# Patient Record
Sex: FEMALE | Race: BLACK OR AFRICAN AMERICAN | NOT HISPANIC OR LATINO | Employment: FULL TIME | URBAN - METROPOLITAN AREA
[De-identification: names, ages, dates, MRNs, and addresses within clinical notes are randomized per-mention and may not be internally consistent; named-entity substitution may affect disease eponyms.]

---

## 2023-10-16 ENCOUNTER — LAB (OUTPATIENT)
Dept: LAB | Facility: LAB | Age: 68
End: 2023-10-16
Payer: COMMERCIAL

## 2023-10-16 ENCOUNTER — APPOINTMENT (OUTPATIENT)
Dept: LAB | Facility: LAB | Age: 68
End: 2023-10-16
Payer: COMMERCIAL

## 2023-10-16 ENCOUNTER — OFFICE VISIT (OUTPATIENT)
Dept: PRIMARY CARE | Facility: CLINIC | Age: 68
End: 2023-10-16
Payer: COMMERCIAL

## 2023-10-16 DIAGNOSIS — Z00.00 HEALTHCARE MAINTENANCE: ICD-10-CM

## 2023-10-16 DIAGNOSIS — M54.81 BILATERAL OCCIPITAL NEURALGIA: ICD-10-CM

## 2023-10-16 DIAGNOSIS — F32.A DEPRESSION, UNSPECIFIED DEPRESSION TYPE: Primary | ICD-10-CM

## 2023-10-16 DIAGNOSIS — I10 HYPERTENSION, UNSPECIFIED TYPE: ICD-10-CM

## 2023-10-16 LAB
ALBUMIN SERPL BCP-MCNC: 4.2 G/DL (ref 3.4–5)
ALP SERPL-CCNC: 83 U/L (ref 33–136)
ALT SERPL W P-5'-P-CCNC: 14 U/L (ref 7–45)
ANION GAP SERPL CALC-SCNC: 12 MMOL/L (ref 10–20)
AST SERPL W P-5'-P-CCNC: 16 U/L (ref 9–39)
BILIRUB SERPL-MCNC: 0.4 MG/DL (ref 0–1.2)
BUN SERPL-MCNC: 12 MG/DL (ref 6–23)
CALCIUM SERPL-MCNC: 9.6 MG/DL (ref 8.6–10.6)
CHLORIDE SERPL-SCNC: 106 MMOL/L (ref 98–107)
CO2 SERPL-SCNC: 28 MMOL/L (ref 21–32)
CREAT SERPL-MCNC: 0.79 MG/DL (ref 0.5–1.05)
ERYTHROCYTE [DISTWIDTH] IN BLOOD BY AUTOMATED COUNT: 15.9 % (ref 11.5–14.5)
EST. AVERAGE GLUCOSE BLD GHB EST-MCNC: 114 MG/DL
GFR SERPL CREATININE-BSD FRML MDRD: 83 ML/MIN/1.73M*2
GLUCOSE SERPL-MCNC: 95 MG/DL (ref 74–99)
HBA1C MFR BLD: 5.6 %
HCT VFR BLD AUTO: 41.1 % (ref 36–46)
HGB BLD-MCNC: 12.6 G/DL (ref 12–16)
MCH RBC QN AUTO: 26.1 PG (ref 26–34)
MCHC RBC AUTO-ENTMCNC: 30.7 G/DL (ref 32–36)
MCV RBC AUTO: 85 FL (ref 80–100)
NRBC BLD-RTO: 0 /100 WBCS (ref 0–0)
PLATELET # BLD AUTO: 329 X10*3/UL (ref 150–450)
PMV BLD AUTO: 10.5 FL (ref 7.5–11.5)
POTASSIUM SERPL-SCNC: 4.1 MMOL/L (ref 3.5–5.3)
PROT SERPL-MCNC: 7.4 G/DL (ref 6.4–8.2)
RBC # BLD AUTO: 4.82 X10*6/UL (ref 4–5.2)
SODIUM SERPL-SCNC: 142 MMOL/L (ref 136–145)
TSH SERPL-ACNC: 2.8 MIU/L (ref 0.44–3.98)
WBC # BLD AUTO: 6.6 X10*3/UL (ref 4.4–11.3)

## 2023-10-16 PROCEDURE — 36415 COLL VENOUS BLD VENIPUNCTURE: CPT

## 2023-10-16 PROCEDURE — 83036 HEMOGLOBIN GLYCOSYLATED A1C: CPT

## 2023-10-16 PROCEDURE — 84443 ASSAY THYROID STIM HORMONE: CPT

## 2023-10-16 PROCEDURE — 85027 COMPLETE CBC AUTOMATED: CPT

## 2023-10-16 PROCEDURE — 82652 VIT D 1 25-DIHYDROXY: CPT

## 2023-10-16 PROCEDURE — 80053 COMPREHEN METABOLIC PANEL: CPT

## 2023-10-16 PROCEDURE — 99214 OFFICE O/P EST MOD 30 MIN: CPT | Performed by: INTERNAL MEDICINE

## 2023-10-16 RX ORDER — BACLOFEN 10 MG/1
10 TABLET ORAL 2 TIMES DAILY
Qty: 60 TABLET | Refills: 0 | Status: SHIPPED | OUTPATIENT
Start: 2023-10-16 | End: 2024-04-19 | Stop reason: ALTCHOICE

## 2023-10-16 RX ORDER — DEXTROMETHORPHAN HYDROBROMIDE, GUAIFENESIN 5; 100 MG/5ML; MG/5ML
650 LIQUID ORAL EVERY 8 HOURS PRN
Qty: 30 TABLET | Refills: 0 | Status: SHIPPED | OUTPATIENT
Start: 2023-10-16 | End: 2023-10-26 | Stop reason: WASHOUT

## 2023-10-16 RX ORDER — CITALOPRAM 20 MG/1
20 TABLET, FILM COATED ORAL DAILY
Qty: 30 TABLET | Refills: 0 | Status: SHIPPED | OUTPATIENT
Start: 2023-10-16 | End: 2023-12-27 | Stop reason: SDUPTHER

## 2023-10-16 ASSESSMENT — PATIENT HEALTH QUESTIONNAIRE - PHQ9
SUM OF ALL RESPONSES TO PHQ9 QUESTIONS 1 AND 2: 6
1. LITTLE INTEREST OR PLEASURE IN DOING THINGS: NEARLY EVERY DAY
7. TROUBLE CONCENTRATING ON THINGS, SUCH AS READING THE NEWSPAPER OR WATCHING TELEVISION: NEARLY EVERY DAY
8. MOVING OR SPEAKING SO SLOWLY THAT OTHER PEOPLE COULD HAVE NOTICED. OR THE OPPOSITE, BEING SO FIGETY OR RESTLESS THAT YOU HAVE BEEN MOVING AROUND A LOT MORE THAN USUAL: NOT AT ALL
4. FEELING TIRED OR HAVING LITTLE ENERGY: NEARLY EVERY DAY
10. IF YOU CHECKED OFF ANY PROBLEMS, HOW DIFFICULT HAVE THESE PROBLEMS MADE IT FOR YOU TO DO YOUR WORK, TAKE CARE OF THINGS AT HOME, OR GET ALONG WITH OTHER PEOPLE: EXTREMELY DIFFICULT
6. FEELING BAD ABOUT YOURSELF - OR THAT YOU ARE A FAILURE OR HAVE LET YOURSELF OR YOUR FAMILY DOWN: NEARLY EVERY DAY
SUM OF ALL RESPONSES TO PHQ QUESTIONS 1-9: 22
2. FEELING DOWN, DEPRESSED OR HOPELESS: NEARLY EVERY DAY
3. TROUBLE FALLING OR STAYING ASLEEP OR SLEEPING TOO MUCH: NEARLY EVERY DAY
9. THOUGHTS THAT YOU WOULD BE BETTER OFF DEAD, OR OF HURTING YOURSELF: NEARLY EVERY DAY
5. POOR APPETITE OR OVEREATING: SEVERAL DAYS

## 2023-10-16 ASSESSMENT — ENCOUNTER SYMPTOMS
CONSTITUTIONAL NEGATIVE: 1
SLEEP DISTURBANCE: 1
HYPERACTIVE: 0
NERVOUS/ANXIOUS: 1
AGITATION: 0
DYSPHORIC MOOD: 1
HALLUCINATIONS: 0
MUSCULOSKELETAL NEGATIVE: 1
CARDIOVASCULAR NEGATIVE: 1
RESPIRATORY NEGATIVE: 1
GASTROINTESTINAL NEGATIVE: 1

## 2023-10-16 NOTE — ASSESSMENT & PLAN NOTE
-Patient was recently in [facility] for severe headaches when she was diagnosed with occipital neuralgia and cervical strain.  -This may likely be stress-induced from work and home related problems.  -She is currently on Aleve as needed for headaches.  -We will start patient on Tylenol 650 mg every 4 as needed for headaches and baclofen for neck strain.  -Advised to hold off Aleve, and only use if headache persistent status post Tylenol.  -We will consider neurology referral at next visit if continued headache.

## 2023-10-16 NOTE — ASSESSMENT & PLAN NOTE
-Patient presented today with complaints of low mood, loss of interest, sleep difficulties, PHQ-9 done was 22.  -Patient has a prior history of major depressive disorders, status post Paxil which she started in 2001, she has stopped for well over approximately 10 years.  -She has a poor social support system  -We will restart the patient on Celexa 20 mg daily  -Will refer to psychotherapy and psychiatrist.

## 2023-10-16 NOTE — PROGRESS NOTES
I saw and evaluated the patient. I personally obtained the key and critical portions of the history and physical exam or was physically present for key and critical portions performed by the resident/fellow. I reviewed the resident/fellow's documentation and discussed the patient with the resident/fellow. I agree with the resident/fellow's medical decision making as documented in the note.    Terra Cruz MD

## 2023-10-16 NOTE — PROGRESS NOTES
Subjective   Patient ID: Michelle Reyez is a 68 y.o. female who presents for issues.    HPI   68-year-old female with past medical history of depression, hypertension, recently diagnosed with occipital neuralgia.  She presented today for checkup and reevaluation.  She presented also complaining of severe stress at work.  Patient states that in the last 3 to 4 months, she has been having a lot of friction and stress at work.  She states that she does not have a supportive manager, and work has become increasingly stressful.  In that timeframe, patient has noticed low mood, loss of interest in pleasurable activity, loss of appetite and sleep disturbance.  Patient does not have a good social support system at home.  She lives alone, and her only living relative is a son, who she does not hear from.  Patient states that she is very depressed, she has suicidal ideations (without a plan), she however denies homicidal ideations.  PHQ-9 done showed a score of 22.  She also presented complaining of headache which is intermittent, has no aggravating or relieving factor, does not radiate.  There is no associated blurring of vision, tinnitus, LOC, projectile vomiting.  Patient is status post [facility] evaluation where she was diagnosed with occipital neuralgia and neck strain.  She was not started on any medication but she uses over-the-counter Aleve as needed for the headaches.    Review of Systems   Constitutional: Negative.    HENT: Negative.     Respiratory: Negative.     Cardiovascular: Negative.    Gastrointestinal: Negative.    Genitourinary: Negative.    Musculoskeletal: Negative.    Psychiatric/Behavioral:  Positive for dysphoric mood, sleep disturbance and suicidal ideas. Negative for agitation and hallucinations. The patient is nervous/anxious. The patient is not hyperactive.        Objective   There were no vitals taken for this visit.    Physical Exam  Constitutional:       Appearance: Normal  appearance.   Cardiovascular:      Rate and Rhythm: Normal rate and regular rhythm.      Pulses: Normal pulses.      Heart sounds: Normal heart sounds.   Neurological:      General: No focal deficit present.      Mental Status: She is alert and oriented to person, place, and time.   Psychiatric:         Mood and Affect: Mood is depressed. Affect is tearful.         Speech: Speech normal.       Patient Health Questionnaire-9 Score: 22     Assessment/Plan   Problem List Items Addressed This Visit             ICD-10-CM    Depression - Primary F32.A     -Patient presented today with complaints of low mood, loss of interest, sleep difficulties, PHQ-9 done was 22.  -Patient has a prior history of major depressive disorders, status post Paxil which she started in 2001, she has stopped for well over approximately 10 years.  -She has a poor social support system  -We will restart the patient on Celexa 20 mg daily  -Will refer to psychotherapy and psychiatrist.         Relevant Medications    citalopram (CeleXA) 20 mg tablet    Other Relevant Orders    Referral to Psychiatry    Referral to Psychology    Bilateral occipital neuralgia M54.81     -Patient was recently in [facility] for severe headaches when she was diagnosed with occipital neuralgia and cervical strain.  -This may likely be stress-induced from work and home related problems.  -She is currently on Aleve as needed for headaches.  -We will start patient on Tylenol 650 mg every 4 as needed for headaches and baclofen for neck strain.  -Advised to hold off and leave, and only use if headache persistent status post Tylenol.  -We will consider neurology referral at next visit if continued headache.         Relevant Medications    baclofen (Lioresal) 10 mg tablet    acetaminophen (Tylenol 8 Hour) 650 mg ER tablet     Other Visit Diagnoses         Codes    Hypertension, unspecified type     I10    Relevant Orders    Comprehensive Metabolic Panel    Thyroid  Stimulating Hormone    Healthcare maintenance     Z00.00    Relevant Orders    BI mammo bilateral screening tomosynthesis    CBC    Hemoglobin A1C    Vitamin D 1,25 Dihydroxy (for eval of hypercalcemia)

## 2023-10-17 LAB — 1,25(OH)2D3 SERPL-MCNC: 83.5 PG/ML (ref 19.9–79.3)

## 2023-11-07 ENCOUNTER — OFFICE VISIT (OUTPATIENT)
Dept: PRIMARY CARE | Facility: CLINIC | Age: 68
End: 2023-11-07
Payer: COMMERCIAL

## 2023-11-07 VITALS
BODY MASS INDEX: 33.15 KG/M2 | SYSTOLIC BLOOD PRESSURE: 147 MMHG | WEIGHT: 199 LBS | DIASTOLIC BLOOD PRESSURE: 96 MMHG | HEIGHT: 65 IN

## 2023-11-07 DIAGNOSIS — M15.9 PRIMARY OSTEOARTHRITIS INVOLVING MULTIPLE JOINTS: Primary | ICD-10-CM

## 2023-11-07 DIAGNOSIS — N39.3 STRESS INCONTINENCE: ICD-10-CM

## 2023-11-07 DIAGNOSIS — F32.0 CURRENT MILD EPISODE OF MAJOR DEPRESSIVE DISORDER, UNSPECIFIED WHETHER RECURRENT (CMS-HCC): ICD-10-CM

## 2023-11-07 PROCEDURE — 99215 OFFICE O/P EST HI 40 MIN: CPT | Performed by: INTERNAL MEDICINE

## 2023-11-07 ASSESSMENT — ENCOUNTER SYMPTOMS
ALLERGIC/IMMUNOLOGIC NEGATIVE: 1
EYES NEGATIVE: 1
CONSTITUTIONAL NEGATIVE: 1
JOINT SWELLING: 1
CARDIOVASCULAR NEGATIVE: 1
DYSPHORIC MOOD: 1
HEMATOLOGIC/LYMPHATIC NEGATIVE: 1
ARTHRALGIAS: 1
RESPIRATORY NEGATIVE: 1
GASTROINTESTINAL NEGATIVE: 1
NERVOUS/ANXIOUS: 1
ENDOCRINE NEGATIVE: 1

## 2023-11-07 ASSESSMENT — LIFESTYLE VARIABLES
HOW OFTEN DO YOU HAVE A DRINK CONTAINING ALCOHOL: NEVER
SKIP TO QUESTIONS 9-10: 1
HOW MANY STANDARD DRINKS CONTAINING ALCOHOL DO YOU HAVE ON A TYPICAL DAY: PATIENT DOES NOT DRINK
HOW OFTEN DO YOU HAVE SIX OR MORE DRINKS ON ONE OCCASION: NEVER
AUDIT-C TOTAL SCORE: 0

## 2023-11-07 ASSESSMENT — PATIENT HEALTH QUESTIONNAIRE - PHQ9
2. FEELING DOWN, DEPRESSED OR HOPELESS: SEVERAL DAYS
SUM OF ALL RESPONSES TO PHQ9 QUESTIONS 1 AND 2: 1
10. IF YOU CHECKED OFF ANY PROBLEMS, HOW DIFFICULT HAVE THESE PROBLEMS MADE IT FOR YOU TO DO YOUR WORK, TAKE CARE OF THINGS AT HOME, OR GET ALONG WITH OTHER PEOPLE: SOMEWHAT DIFFICULT
1. LITTLE INTEREST OR PLEASURE IN DOING THINGS: NOT AT ALL

## 2023-11-07 NOTE — PROGRESS NOTES
I saw and evaluated the patient. I personally obtained the key and critical portions of the history and physical exam or was physically present for key and critical portions performed by the resident/fellow. I reviewed the resident/fellow's documentation and discussed the patient with the resident/fellow. I agree with the resident/fellow's medical decision making as documented in the note.    Terra Cruz MD       pneumonia pneumonia pneumonia pneumonia

## 2023-11-07 NOTE — PROGRESS NOTES
"Subjective   Patient ID: Michelle Reyez is a 68 y.o. female who presents for Follow-up.    HPI   68-year-old female with past medical history of depression, hypertension, recently diagnosed with occipital neuralgia, OA of the right knee who presents to the clinic for follow up on her depression.  The patient was not able to take her citalopram except lately as it was sent to a wrong pharmacy. She started taking it a week ago. She is still having depression and she doesn't feel enough support from her colleagues at work or her son. She was also complaining of left knee pain, with crepitus of the left knee with movement, She doesn't use a cane, but she sometimes loses balance and lean on the wall. She was also reported having urinary incontinence with laughing, sneezing or increased pressure. Denies any f/c, n/v, new onset headaches, vision changes, chest pain, dyspnea, abdominal pain, or changes in BM. She denies any intent to harm herself or others.    Review of Systems   Constitutional: Negative.    HENT: Negative.     Eyes: Negative.    Respiratory: Negative.     Cardiovascular: Negative.    Gastrointestinal: Negative.    Endocrine: Negative.    Genitourinary: Negative.         Positive for incontinence    Musculoskeletal:  Positive for arthralgias, gait problem and joint swelling.   Skin: Negative.    Allergic/Immunologic: Negative.    Hematological: Negative.    Psychiatric/Behavioral:  Positive for dysphoric mood. Negative for suicidal ideas. The patient is nervous/anxious.        Objective   BP (!) 147/96 (BP Location: Right arm, Patient Position: Sitting, BP Cuff Size: Large adult)   Ht 1.651 m (5' 5\")   Wt 90.3 kg (199 lb)   BMI 33.12 kg/m²     Physical Exam  Constitutional:       General: She is not in acute distress.     Appearance: Normal appearance. She is not ill-appearing.   HENT:      Head: Normocephalic and atraumatic.      Nose: Nose normal.      Mouth/Throat:      Mouth: Mucous membranes are " moist.      Pharynx: Oropharynx is clear.   Eyes:      Extraocular Movements: Extraocular movements intact.      Conjunctiva/sclera: Conjunctivae normal.      Pupils: Pupils are equal, round, and reactive to light.   Cardiovascular:      Rate and Rhythm: Normal rate and regular rhythm.      Pulses: Normal pulses.      Heart sounds: Normal heart sounds.   Pulmonary:      Effort: Pulmonary effort is normal.      Breath sounds: Normal breath sounds.   Abdominal:      General: Abdomen is flat. Bowel sounds are normal.      Palpations: Abdomen is soft.   Musculoskeletal:         General: Swelling (left knee) and tenderness (Left knee crepitus) present. Normal range of motion.      Cervical back: Normal range of motion and neck supple.   Skin:     General: Skin is warm and dry.   Neurological:      General: No focal deficit present.      Mental Status: She is alert and oriented to person, place, and time. Mental status is at baseline.   Psychiatric:         Behavior: Behavior normal.         Thought Content: Thought content normal.      Comments: Depressed         Assessment/Plan   Diagnoses and all orders for this visit:  68-year-old female with past medical history of depression, hypertension, recently diagnosed with occipital neuralgia, OA of the right knee who presents to the clinic for follow up on her depression.  Current mild episode of major depressive disorder, unspecified whether recurrent (CMS/HCC)  -Patient wasn't able to take citalopram prescribed for her till last week, she was advised it will take few weeks to be effective.  - PHQ done today score 11, moderate depression.  - She denied any suicidal ideation.  - She was advised to be part of a support group.  -     Referral to Psychiatry; Future  Primary osteoarthritis involving multiple joints  -Mainly left knee, with crepitus.  -She will be trying scheduled tylenol 2-3 times daily and referral to physical therapy  -     Referral to Physical Therapy;  Future  Stress incontinence  - Patient reported having urinary incontenience with laughing coughing and sneezing.  - She had similar symptoms in the past after giving birth 30 years ago and Kegel exercises weren't very helpful.  -     Referral to Gynecology; Future  -Pelvic floor physical therapy.    Follow up in 3 weeks.

## 2023-11-16 ENCOUNTER — DOCUMENTATION (OUTPATIENT)
Dept: UROLOGY | Facility: CLINIC | Age: 68
End: 2023-11-16
Payer: COMMERCIAL

## 2023-11-16 NOTE — RESEARCH NOTES
Patient participating in research study as per specific study details below:   IRB#: [id number]  Time Point: Consent   Name of Study: EMPOWER Study  Visit Type: Enrollment

## 2023-11-28 ENCOUNTER — APPOINTMENT (OUTPATIENT)
Dept: PRIMARY CARE | Facility: CLINIC | Age: 68
End: 2023-11-28
Payer: COMMERCIAL

## 2023-12-11 ENCOUNTER — EVALUATION (OUTPATIENT)
Dept: PHYSICAL THERAPY | Facility: CLINIC | Age: 68
End: 2023-12-11
Payer: COMMERCIAL

## 2023-12-11 DIAGNOSIS — M25.561 CHRONIC PAIN OF BOTH KNEES: Primary | ICD-10-CM

## 2023-12-11 DIAGNOSIS — G89.29 CHRONIC PAIN OF BOTH KNEES: Primary | ICD-10-CM

## 2023-12-11 DIAGNOSIS — M25.562 CHRONIC PAIN OF BOTH KNEES: Primary | ICD-10-CM

## 2023-12-11 DIAGNOSIS — M15.9 PRIMARY OSTEOARTHRITIS INVOLVING MULTIPLE JOINTS: ICD-10-CM

## 2023-12-11 PROCEDURE — 97161 PT EVAL LOW COMPLEX 20 MIN: CPT | Mod: GP | Performed by: PHYSICAL THERAPIST

## 2023-12-11 PROCEDURE — 97110 THERAPEUTIC EXERCISES: CPT | Mod: GP | Performed by: PHYSICAL THERAPIST

## 2023-12-11 ASSESSMENT — ENCOUNTER SYMPTOMS
LOSS OF SENSATION IN FEET: 0
DEPRESSION: 1
OCCASIONAL FEELINGS OF UNSTEADINESS: 0

## 2023-12-11 NOTE — PROGRESS NOTES
Physical Therapy  Physical Therapy Orthopedic Evaluation    Patient Name: Michelle Reyez  MRN: [id number]  Today's Date: 12/11/2023  Time Calculation  Start Time: 0615  Stop Time: 0650  Time Calculation (min): 35 min  Reason for visit: OA multiple sites, left knee   Referring MD: Terra Cruz MD   Insurance: [place] - 30 visits , no AUTH 30% co insurance  DX: MEGHA knee pain chronic, OA multiple sites   POC 2 x week 6 weeks - when able to schedule         Current Problem  1. Chronic pain of both knees  Follow Up In Physical Therapy      2. Primary osteoarthritis involving multiple joints  Referral to Physical Therapy          General:  General  Reason for Referral: Left knee OA  Referred By: Terra Cruz MD  Precautions:  Precautions  STEADI Fall Risk Score (The score of 4 or more indicates an increased risk of falling): 5  Precautions Comment: Falls,  Pain:       Subjective:   Subjective   Chief Complaint: Patient reports history of arthritis mostly my left knee and sometimes my right.  I went for my physical with my Dr and when he was moving my knee and you could hear noises.    Onset:  11/7/23 (script)   SMILEY: OA   I have trouble going up the steps, especially if carrying laundry etc     Current Condition: worsening with weather     PAIN  Intensity (0-10): 2/10 up to 8/10  Location: left knee front and sometime the back   Description: throbbing toothache    Aggravating Factors:  steps, walking  Relieving Factors:  lidocaine, heat    Relevant Information (PMH & Previous Tests/Imaging): has not had any recent xrays or imaging   PMH: depression PHQ completed at MD visit, hypertension, recently diagnosed with occipital neuralgia, OA ,  urinary incontinence   Previous Interventions/Treatments: none     Prior Level of Function (PLOF)  Exercise/Physical Activity: does rebounder, leg exercises   Work/School:Full Time   for [employer]) on leave of absence   Living situation/Environment: lives  alone multistory home    Treatment Goals: Get more mobility in knee, walk better, do stairs better     Red Flags: Do you have any of the following? No   Fever/chills, unexplained weight changes, dizziness/fainting, unexplained change in bowel or bladder functions, unexplained malaise or muscle weakness, night pain/sweats, numbness or tingling    Objective:  Objective       Observation/Posture:  genu valgus right > left  , ant PT , patella hypomobility MEGHA   Gait: w/o a.d antalgic, left lat trunk lean with stance, dec stance left , dec stride MEGHA , dec push off     Transfers:  sit <> stand: moderate difficulty    Balance  SL Balance: right fair, left poor       Knee ROM        Left knee: Ext= -7  Flex= 85  Right knee: Ext= -5  Flex= 103    Knee Strength        Extension: L= 4/5 [] R= 5-/5  Flexion: L= 4/5 [] R= 5-/5     Hip Strength MMT  Flex: L= 4-/5 [] R= 4-/5   Abd L= 4-/5 [] R= 4-/5  Add L= NT/5 [] R= NT/5  Ext L= 4-/5 [] R= 4-/5    Special tests:  Varus negative   Valgus + left   Patellar Apprehension + MEGHA L>R   Andrade  Lachmans       Outcome Measures:  Other Measures  Lower Extremity Funtional Score (LEFS): 47/80     EDUCATION: home exercise program, plan of care, activity modifications, pain management, and injury pathology       Goals:  Active       PT Problem       PT Goal 1       Start:  12/11/23    Expected End:  01/22/24       Patient will demonstrate good understanding and compliance with HEP   ROM left knee -5 to 120         PT Goal 2       Start:  12/11/23    Expected End:  02/19/24       Knee ROM MEGHA -3 to 120 or better  Will ascend/descend stairs with reciprocal pattern  and rail to improve household mobility   Tolerate walking for normal activities and work w/o need to reach for external support  Strength left knee 5-/5 , hips 4+/5 to improve gait and transfers   Increase LEFS score by 9 points                Treatments:   Access Code: [email or web address]  URL:  [email or web address]  Date: 12/11/2023  Prepared by: Yunier Cuenca    Exercises  - Supine Quadricep Sets  - 2-3 x daily - 1-2 sets - 10 reps - 5 hold  - Supine Heel Slide with Strap  - 2-3 x daily - 15-20 reps - 5 hold  - Long Sitting Calf Stretch with Strap  - 2-3 x daily - 1 sets - 3-4 reps - 30 hold  - Supine Knee Extension Strengthening  - 1-2 x daily - 20-30 reps  - Small Range Straight Leg Raise  - 1-2 x daily - 2-3 sets - 10 reps  - Sidelying Hip Abduction  - 1-2 x daily - 2-3 sets - 10 reps    Assessment: Patient presents with signs and symptoms consistent with knee OA , resulting in limited participation in pain-free ADLs and inability to perform at their prior level of function. Pt would benefit from physical therapy to address the impairments found & listed previously in the objective section in order to return to safe and pain-free ADLs and prior level of function.     Pain, Impaired balance, Impaired gait, Impaired stair negotiation , Impaired transfers, Decreased flexibility, Decreased strength, Limitations to normal ADL's, Fall risk , and Decreased knowledge of HEP     Plan:   Certification Period Start Date: 12/11/23  Certification Period End Date: 03/10/24  Planned Interventions include: therapeutic exercise, self-care home management, manual therapy, therapeutic activities, gait training, neuromuscular coordination, aquatic therapy, modalities PRN  Frequency: 2 /week   Duration: 6 weeks when able to schedule     Yunier Cuenca, PT

## 2023-12-13 ENCOUNTER — TREATMENT (OUTPATIENT)
Dept: PHYSICAL THERAPY | Facility: CLINIC | Age: 68
End: 2023-12-13
Payer: COMMERCIAL

## 2023-12-13 DIAGNOSIS — M25.561 CHRONIC PAIN OF BOTH KNEES: Primary | ICD-10-CM

## 2023-12-13 DIAGNOSIS — M25.562 CHRONIC PAIN OF BOTH KNEES: Primary | ICD-10-CM

## 2023-12-13 DIAGNOSIS — G89.29 CHRONIC PAIN OF BOTH KNEES: Primary | ICD-10-CM

## 2023-12-13 DIAGNOSIS — M15.9 PRIMARY OSTEOARTHRITIS INVOLVING MULTIPLE JOINTS: ICD-10-CM

## 2023-12-13 PROCEDURE — 97110 THERAPEUTIC EXERCISES: CPT | Mod: GP,CQ

## 2023-12-13 PROCEDURE — 97112 NEUROMUSCULAR REEDUCATION: CPT | Mod: GP,CQ

## 2023-12-13 ASSESSMENT — PAIN SCALES - GENERAL: PAINLEVEL_OUTOF10: 0 - NO PAIN

## 2023-12-13 ASSESSMENT — PAIN - FUNCTIONAL ASSESSMENT: PAIN_FUNCTIONAL_ASSESSMENT: 0-10

## 2023-12-13 NOTE — PROGRESS NOTES
Physical Therapy  Physical Therapy Treatment    Patient Name: Michelle Reyez  MRN: [id number]  Today's Date: 12/13/2023  Time Calculation  Start Time: 0750  Stop Time: 0835  Time Calculation (min): 45 min    Insurance:  Visit number: 2 of 30  Authorization info: Not needed  Insurance Type: [employer] [place], [place]  Cert date start:    Cert date end:                 General   Reason for visit- OA multiple sites, Lt. Knee  Referring doctor- Terra Cruz MD    Current Problem  1. Chronic pain of both knees        2. Primary osteoarthritis involving multiple joints            Precautions  Precautions Comment: Falls,    Pain Assessment: 0-10  Pain Score: 0 - No pain    Subjective:  Patient reports that her left knee is what's bothering her.  But no pain.  HEP Performed:  Yes    Objective:   Function- walking with an antalgic gait.    Treatments:   Recumbent stepper- 4'  DBE 2x1.5'  Bosu lunge alternating R/L- 1.5'  Biodex balance LOS L12- 43%, 68%  KB 4kg tandem stand R/L, L/R cw/ccw- 1' ea dir.  Hamstring curl 25# 1x20  Hip ab/ad 32.5#/32.5# 2x12  Ice bilateral knees- 10'    Charges: TE 1, NME 1 (CQ Modifier)    Assessment:   Very pleasant to work with.  Other than for her limp, no problems post.    Plan:   Continue decreasing left knee discomfort increasing rom, flexibility, mobility, balance, strength, endurance and function for return to normal ADL.    Cristian David, PTA

## 2023-12-21 ENCOUNTER — OFFICE VISIT (OUTPATIENT)
Dept: OBSTETRICS AND GYNECOLOGY | Facility: CLINIC | Age: 68
End: 2023-12-21
Payer: COMMERCIAL

## 2023-12-21 VITALS — BODY MASS INDEX: 33.35 KG/M2 | WEIGHT: 200.4 LBS | SYSTOLIC BLOOD PRESSURE: 138 MMHG | DIASTOLIC BLOOD PRESSURE: 94 MMHG

## 2023-12-21 DIAGNOSIS — N39.3 STRESS INCONTINENCE: ICD-10-CM

## 2023-12-21 DIAGNOSIS — Z01.419 WOMEN'S ANNUAL ROUTINE GYNECOLOGICAL EXAMINATION: Primary | ICD-10-CM

## 2023-12-21 PROCEDURE — 88141 CYTOPATH C/V INTERPRET: CPT | Performed by: PATHOLOGY

## 2023-12-21 PROCEDURE — 88175 CYTOPATH C/V AUTO FLUID REDO: CPT

## 2023-12-21 PROCEDURE — 1126F AMNT PAIN NOTED NONE PRSNT: CPT | Performed by: OBSTETRICS & GYNECOLOGY

## 2023-12-21 PROCEDURE — 1159F MED LIST DOCD IN RCRD: CPT | Performed by: OBSTETRICS & GYNECOLOGY

## 2023-12-21 PROCEDURE — 99387 INIT PM E/M NEW PAT 65+ YRS: CPT | Performed by: OBSTETRICS & GYNECOLOGY

## 2023-12-21 PROCEDURE — 1036F TOBACCO NON-USER: CPT | Performed by: OBSTETRICS & GYNECOLOGY

## 2023-12-21 NOTE — PROGRESS NOTES
Michelle Reyez is a 68 y.o. female who presents with a chief complaint of Annual Exam      SUBJECTIVE  annual    Past Medical History:   Diagnosis Date    Body mass index (BMI) 32.0-32.9, adult 09/23/2020    BMI 32.0-32.9,adult    Body mass index (BMI) 34.0-34.9, adult 09/23/2020    Body mass index (BMI) of 34.0 to 34.9 in adult    Other conditions influencing health status 10/18/2018    History of cough    Other specified disorders of nose and nasal sinuses 10/18/2018    Sinus pressure    Unspecified injury of unspecified wrist, hand and finger(s), initial encounter     Wrist injury     History reviewed. No pertinent surgical history.  Social History     Socioeconomic History    Marital status:      Spouse name: None    Number of children: None    Years of education: None    Highest education level: None   Occupational History    None   Tobacco Use    Smoking status: Never    Smokeless tobacco: Never   Vaping Use    Vaping Use: Never used   Substance and Sexual Activity    Alcohol use: Never    Drug use: Never    Sexual activity: Defer   Other Topics Concern    None   Social History Narrative    None     Social Determinants of Health     Financial Resource Strain: Not on file   Food Insecurity: Not on file   Transportation Needs: Not on file   Physical Activity: Not on file   Stress: Not on file   Social Connections: Not on file   Intimate Partner Violence: Not on file   Housing Stability: Not on file     No family history on file.    OB History   No obstetric history on file.       OBJECTIVE  Allergies   Allergen Reactions    Penicillins Anaphylaxis and Rash     Thinks gets hives and throat closed    Strawberry Rash      (Not in a hospital admission)       Review of Systems  History obtained from the patient  General ROS: negative  Psychological ROS: negative  Gastrointestinal ROS: no abdominal pain, change in bowel habits, or black or bloody stools  Musculoskeletal ROS: negative  Physical Exam  General  Appearance: awake, alert, oriented, in no acute distress, well developed, well nourished, and in no acute distress  Skin: there are no suspicious lesions or rashes of concern  Head/Face: NCAT  Eyes: No gross abnormalities., PERRL, and EOMI  Back: mild pain to palpation in the LS spine midline  Breast: BREAST EXAM: normal  Abdomen: Soft, non-tender, normal bowel sounds; no bruits, organomegaly or masses.  Extremities: Extremities warm to touch, pink, with no edema.  Musculoskeletal: negative  Urogen: External genitalia: Normal, Vagina: Normal, Cervix: Normal, and Bimanual exam: Normal    BP (!) 138/94   Wt 90.9 kg (200 lb 6.4 oz)   BMI 33.35 kg/m²    Problem List Items Addressed This Visit    None  Visit Diagnoses       Women's annual routine gynecological examination    -  Primary    Relevant Orders    THINPREP PAP    BI mammo bilateral screening tomosynthesis    Stress incontinence

## 2023-12-27 ENCOUNTER — OFFICE VISIT (OUTPATIENT)
Dept: PRIMARY CARE | Facility: CLINIC | Age: 68
End: 2023-12-27
Payer: COMMERCIAL

## 2023-12-27 VITALS — SYSTOLIC BLOOD PRESSURE: 130 MMHG | DIASTOLIC BLOOD PRESSURE: 90 MMHG | WEIGHT: 200 LBS | BODY MASS INDEX: 33.28 KG/M2

## 2023-12-27 DIAGNOSIS — Z12.11 COLON CANCER SCREENING: ICD-10-CM

## 2023-12-27 DIAGNOSIS — Z13.820 OSTEOPOROSIS SCREENING: ICD-10-CM

## 2023-12-27 DIAGNOSIS — Z12.31 ENCOUNTER FOR SCREENING MAMMOGRAM FOR MALIGNANT NEOPLASM OF BREAST: Primary | ICD-10-CM

## 2023-12-27 DIAGNOSIS — F32.A DEPRESSION, UNSPECIFIED DEPRESSION TYPE: ICD-10-CM

## 2023-12-27 PROCEDURE — 1126F AMNT PAIN NOTED NONE PRSNT: CPT | Performed by: INTERNAL MEDICINE

## 2023-12-27 PROCEDURE — 99397 PER PM REEVAL EST PAT 65+ YR: CPT | Performed by: INTERNAL MEDICINE

## 2023-12-27 PROCEDURE — 1159F MED LIST DOCD IN RCRD: CPT | Performed by: INTERNAL MEDICINE

## 2023-12-27 PROCEDURE — 1036F TOBACCO NON-USER: CPT | Performed by: INTERNAL MEDICINE

## 2023-12-27 RX ORDER — CITALOPRAM 20 MG/1
20 TABLET, FILM COATED ORAL DAILY
Qty: 30 TABLET | Refills: 0 | Status: SHIPPED | OUTPATIENT
Start: 2023-12-27 | End: 2024-01-20

## 2023-12-27 ASSESSMENT — ENCOUNTER SYMPTOMS
BACK PAIN: 0
APPETITE CHANGE: 0
CHEST TIGHTNESS: 0
CONSTIPATION: 0
FREQUENCY: 1
HEMATURIA: 0
DIARRHEA: 0
ARTHRALGIAS: 1
JOINT SWELLING: 0
SLEEP DISTURBANCE: 1
FATIGUE: 0
COUGH: 0

## 2023-12-27 ASSESSMENT — PATIENT HEALTH QUESTIONNAIRE - PHQ9
1. LITTLE INTEREST OR PLEASURE IN DOING THINGS: MORE THAN HALF THE DAYS
SUM OF ALL RESPONSES TO PHQ9 QUESTIONS 1 AND 2: 4
2. FEELING DOWN, DEPRESSED OR HOPELESS: MORE THAN HALF THE DAYS

## 2023-12-27 NOTE — PROGRESS NOTES
Primary Care Visit Note    Patient: Michelle Reyez, Age: 68 y.o., SEX: female , MRN:[id number],   PCP: Terra Cruz MD        Resident:  Robert Torres MD   Preferred Pharmacy:   [facility]/pharmacy #[id number] - [place] [facility], [place] - [address] [facility]  [address]  [place] [address]  Phone: [phone] Fax: [phone]          Chief Complaint     Patient: Michelle Reyez is a 68 y.o. female who presented to the clinic for   Chief Complaint   Patient presents with    Annual Exam    Med Refill        Subjective      HPI    Michelle Reyez is a 68 y.o. year old female patient with a PMH significant for HTN, MDD/JOSEPHINE, occipital neuralgia, OA of rt. Knee, urge incontinence  presents to the clinic for annual physical exam and meds refill.      In regards to her active problems, patient was unable to get a hold of behavioral therapy.  Patient states that back-to-back festive seasons and her being alone has caused her depression to stay the same.  Patient does believe that citalopram helps her a lot.  But patient also endorses that talking to behavioral health provider will help dilate her mood.  Patient states that previously when she was going through divorce and child custody she had behavioral health therapy and that helped her a lot.  Her appointment with behavioral health provider is scheduled for February.  Patient otherwise denies any thoughts of self-harm but does state that he has been sleeping a lot, has appetite changes and has little interest in doing things.    Over the past 2 weeks, how often have you been bothered by any of the following problems?   Little interest or pleasure in doing things: More than half the days  Feeling down, depressed, or hopeless: More than half the days    ROS   Review of Systems   Constitutional:  Negative for appetite change and fatigue.   Respiratory:  Negative for cough and chest tightness.    Cardiovascular:  Negative for chest pain.   Gastrointestinal:  Negative for  constipation and diarrhea.   Genitourinary:  Positive for frequency and urgency. Negative for enuresis and hematuria.   Musculoskeletal:  Positive for arthralgias. Negative for back pain and joint swelling.   Psychiatric/Behavioral:  Positive for behavioral problems and sleep disturbance.       Past History     PMHx:    has a past medical history of Body mass index (BMI) 32.0-32.9, adult (09/23/2020), Body mass index (BMI) 34.0-34.9, adult (09/23/2020), Other conditions influencing health status (10/18/2018), Other specified disorders of nose and nasal sinuses (10/18/2018), and Unspecified injury of unspecified wrist, hand and finger(s), initial encounter.   Allergies:   Allergies   Allergen Reactions    Penicillins Anaphylaxis and Rash     Thinks gets hives and throat closed    [place] Rash      Surgical Hx:   No past surgical history on file.   Social HX:   Social History     Tobacco Use    Smoking status: Never    Smokeless tobacco: Never   Vaping Use    Vaping Use: Never used   Substance Use Topics    Alcohol use: Never    Drug use: Never      MEDS:   Current Outpatient Medications   Medication Instructions    baclofen (LIORESAL) 10 mg, oral, 2 times daily    citalopram (CELEXA) 20 mg, oral, Daily      Objective      Visit Vitals  /90      BMI Readings from Last 15 Encounters:   12/27/23 33.28 kg/m²   12/21/23 33.35 kg/m²   11/07/23 33.12 kg/m²      Lab Results   Component Value Date    HGBA1C 5.6 10/16/2023      Lab Results   Component Value Date    HGBA1C 5.6 10/16/2023    HGBA1C 5.6 09/23/2020     Lab Results   Component Value Date    CREATININE 0.79 10/16/2023      Lab Results   Component Value Date    WBC 6.6 10/16/2023    HGB 12.6 10/16/2023    HCT 41.1 10/16/2023    MCV 85 10/16/2023     10/16/2023        Chemistry    Lab Results   Component Value Date/Time     10/16/2023 0940    K 4.1 10/16/2023 0940     10/16/2023 0940    CO2 28 10/16/2023 0940    BUN 12 10/16/2023 0940     CREATININE 0.79 10/16/2023 0940    Lab Results   Component Value Date/Time    CALCIUM 9.6 10/16/2023 0940    ALKPHOS 83 10/16/2023 0940    AST 16 10/16/2023 0940    ALT 14 10/16/2023 0940    BILITOT 0.4 10/16/2023 0940             Physical Exam  Physical Exam  Constitutional:       Appearance: Normal appearance.   HENT:      Head: Normocephalic and atraumatic.      Mouth/Throat:      Mouth: Mucous membranes are moist.   Cardiovascular:      Rate and Rhythm: Normal rate and regular rhythm.      Heart sounds: No murmur heard.     No friction rub. No gallop.   Pulmonary:      Breath sounds: Normal breath sounds. No stridor. No wheezing or rhonchi.   Abdominal:      General: Abdomen is flat. Bowel sounds are normal.      Palpations: Abdomen is soft. There is no mass.      Tenderness: There is no abdominal tenderness. There is no guarding.   Musculoskeletal:         General: No swelling or tenderness. Normal range of motion.      Cervical back: Normal range of motion.   Neurological:      General: No focal deficit present.      Mental Status: She is alert and oriented to person, place, and time.   Psychiatric:         Mood and Affect: Mood normal.         Behavior: Behavior normal.        Assessment and Plan     Assessment/Plan    The following medical issues were discussed during this encounter: Michelle was seen today for annual exam and med refill. Diagnoses and all orders for this visit:    # Depression, unspecified depression type  -     Extensive discussion with patient regarding her current mood status  -     Patient otherwise depressed, down.  Specially due to the festive season and patient being alone  -     Patient does endorse that citalopram has been helping her symptoms to some extent.  States talking to someone will help her even more as it has previously helped her depression as well  -     Refilled citalopram (CeleXA) 20 mg tablet; Take 1 tablet (20 mg) by mouth once daily.  -     [facility]; Future  -     Also give patient paperwork to call around and schedule appointment with behavioral health therapy.    # Primary osteoarthritis involving lower knees  -    Patient follows up with physical therapy.  Has multiple appointments set up for the future    # Stress incontinence  -    Patient follows up with OB/GYN.  Has next follow-up coming up next month. Will discuss pelvic floor exercises/pelvic physical therapy    # Age Related Osteoporosis screening  -     Ordered XR DEXA bone density; Future    Immunizations: UTD     Please return on February 21 at 8:40 AM or if symptoms worsen     Robert Torres MD  Internal Medicine, PGY- 1  12/27/23 at 4:45 PM     Disclaimer: Documentation completed with the information available at the time of input. The times in the chart may not be reflective of actual patient care times, interventions, or procedures. Documentation occurs after the physical care of the patient.

## 2024-01-03 ENCOUNTER — TREATMENT (OUTPATIENT)
Dept: PHYSICAL THERAPY | Facility: CLINIC | Age: 69
End: 2024-01-03
Payer: COMMERCIAL

## 2024-01-03 DIAGNOSIS — M25.562 CHRONIC PAIN OF BOTH KNEES: ICD-10-CM

## 2024-01-03 DIAGNOSIS — G89.29 CHRONIC PAIN OF BOTH KNEES: ICD-10-CM

## 2024-01-03 DIAGNOSIS — M25.561 CHRONIC PAIN OF BOTH KNEES: ICD-10-CM

## 2024-01-03 PROCEDURE — 97112 NEUROMUSCULAR REEDUCATION: CPT | Mod: GP | Performed by: PHYSICAL THERAPIST

## 2024-01-03 PROCEDURE — 97110 THERAPEUTIC EXERCISES: CPT | Mod: GP | Performed by: PHYSICAL THERAPIST

## 2024-01-03 NOTE — PROGRESS NOTES
Physical Therapy  Physical Therapy Treatment    Patient Name: Michelle Reyez  MRN: [id number]  Today's Date: 1/3/2024  Time Calculation  Start Time: 1812  Stop Time: 1900  Time Calculation (min): 48 min    Insurance:  Visit number: 3 of 30  Authorization info: Not needed  Insurance Type: [employer] [employer] [place], [place]  Cert date start:    Cert date end:                 General   Reason for visit- OA multiple sites, Lt. Knee  Referring doctor- Terra Cruz MD    Current Problem  1. Chronic pain of both knees  Follow Up In Physical Therapy                    Subjective:  Right now no pain but I have trouble going up the steps.  I have more trouble with my right when I go up.  I have only been doing the exercises on my left at home, I didn't know I should do both.    HEP Performed:  Yes    Objective:   Function- walking with an antalgic gait, w/o a.d       Knee ROM      ( ) = measurements at evaluation                                                                Left knee: Ext= (-7)         Flex= 80 (85)  Right knee: Ext= -2(-5)       Flex= 105 (103)    Treatments:   Recumbent stepper- 5'  DBE 2x2'  KB 4kg tandem stand R/L, L/R cw/ccw- 10 ea dir.  Sit to stand with arms  2x10 (Added to HEP)    Stand hip abd 2x10 yenny (Added to HEP)    Stand hip ext 2x10 yenny (Added to HEP)    Heel slide- reviewed   Hamstring curl 25# 2x12  Hip ab/ad 32.5#/32.5# 2x12  Ice bilateral knees- 10'      Access Code: [email or web address]   Assessment:   Tolerated session well w/o increased pain.  Did struggle more with right leg for strengthening.      Plan:   Continue decreasing left knee discomfort increasing rom, flexibility, mobility, balance, strength, endurance and function for return to normal ADL.    Yunier Cuenca, PT

## 2024-01-04 ENCOUNTER — ANCILLARY PROCEDURE (OUTPATIENT)
Dept: RADIOLOGY | Facility: CLINIC | Age: 69
End: 2024-01-04
Payer: COMMERCIAL

## 2024-01-04 DIAGNOSIS — Z01.419 WOMEN'S ANNUAL ROUTINE GYNECOLOGICAL EXAMINATION: ICD-10-CM

## 2024-01-04 PROCEDURE — 77067 SCR MAMMO BI INCL CAD: CPT

## 2024-01-04 PROCEDURE — 77067 SCR MAMMO BI INCL CAD: CPT | Performed by: RADIOLOGY

## 2024-01-04 PROCEDURE — 77063 BREAST TOMOSYNTHESIS BI: CPT | Performed by: RADIOLOGY

## 2024-01-08 ENCOUNTER — HOSPITAL ENCOUNTER (OUTPATIENT)
Dept: RADIOLOGY | Facility: EXTERNAL LOCATION | Age: 69
Discharge: HOME | End: 2024-01-08

## 2024-01-08 ENCOUNTER — TREATMENT (OUTPATIENT)
Dept: PHYSICAL THERAPY | Facility: CLINIC | Age: 69
End: 2024-01-08
Payer: COMMERCIAL

## 2024-01-08 DIAGNOSIS — G89.29 CHRONIC PAIN OF BOTH KNEES: ICD-10-CM

## 2024-01-08 DIAGNOSIS — M25.561 CHRONIC PAIN OF BOTH KNEES: ICD-10-CM

## 2024-01-08 DIAGNOSIS — M25.562 CHRONIC PAIN OF BOTH KNEES: ICD-10-CM

## 2024-01-08 PROCEDURE — 97112 NEUROMUSCULAR REEDUCATION: CPT | Mod: GP | Performed by: PHYSICAL THERAPIST

## 2024-01-08 PROCEDURE — 97110 THERAPEUTIC EXERCISES: CPT | Mod: GP | Performed by: PHYSICAL THERAPIST

## 2024-01-08 PROCEDURE — 97016 VASOPNEUMATIC DEVICE THERAPY: CPT | Mod: GP | Performed by: PHYSICAL THERAPIST

## 2024-01-08 NOTE — PROGRESS NOTES
Physical Therapy  Physical Therapy Treatment    Patient Name: Michelle Reyez  MRN: [id number]  Today's Date: 1/8/2024  Time Calculation  Start Time: 0615  Stop Time: 0703  Time Calculation (min): 48 min    Insurance:  Visit number: 4 of 30  Authorization info: Not needed  Insurance Type: [place] [employer] of [place], [place]  Cert date start:    Cert date end:                 General   Reason for visit- OA multiple sites, Lt. Knee  Referring doctor- Terra Cruz MD    Current Problem  1. Chronic pain of both knees  Follow Up In Physical Therapy                    Subjective:  No current complaint of pain.  Reports compliance with HEP but not the new exercises.  Reports tht she tried to jump rope the other day but was unable to.    HEP Performed:  Yes    Objective:   Function- walking with an antalgic gait, w/o a.d , increased lateral trunk lean in stance.        Knee ROM      ( ) = measurements at evaluation                                                                Left knee: Ext= (-7)         Flex= 80 (85)  Right knee: Ext= -2(-5)       Flex= 105 (103)    Treatments:   Recumbent stepper- 5'  DBE 2x2'  KB 4kg tandem stand R/L, L/R cw/ccw- 10 ea dir.  Sit to stand with arms for up   2x12    Stand hip abd 2x10 yenny   Stand hip ext 2x10 yenny   Heel slide- reviewed   Hamstring curl 30# 2x12  Hip ab/ad 32.5#/32.5# 2x12  Ice bilateral knees- 10'      Access Code: [email or web address]   Assessment:   No new additions to Hep as patient has not performed the new ones from last session yet.  Needed UE assist to stand up from chair but able to lower without help.      Plan:   Continue decreasing left knee discomfort increasing rom, flexibility, mobility, balance, strength, endurance and function for return to normal ADL.    Yunier Cuenca, PT

## 2024-01-09 LAB
CYTOLOGY CMNT CVX/VAG CYTO-IMP: NORMAL
LAB AP HPV GENOTYPE QUESTION: YES
LAB AP HPV HR: NORMAL
LABORATORY COMMENT REPORT: NORMAL
PATH REPORT.TOTAL CANCER: NORMAL

## 2024-01-10 ENCOUNTER — TREATMENT (OUTPATIENT)
Dept: PHYSICAL THERAPY | Facility: CLINIC | Age: 69
End: 2024-01-10
Payer: COMMERCIAL

## 2024-01-10 DIAGNOSIS — M25.561 CHRONIC PAIN OF BOTH KNEES: ICD-10-CM

## 2024-01-10 DIAGNOSIS — G89.29 CHRONIC PAIN OF BOTH KNEES: ICD-10-CM

## 2024-01-10 DIAGNOSIS — M25.562 CHRONIC PAIN OF BOTH KNEES: ICD-10-CM

## 2024-01-10 PROCEDURE — 97110 THERAPEUTIC EXERCISES: CPT | Mod: GP | Performed by: PHYSICAL THERAPIST

## 2024-01-10 PROCEDURE — 97112 NEUROMUSCULAR REEDUCATION: CPT | Mod: GP | Performed by: PHYSICAL THERAPIST

## 2024-01-10 ASSESSMENT — PAIN - FUNCTIONAL ASSESSMENT: PAIN_FUNCTIONAL_ASSESSMENT: 0-10

## 2024-01-10 ASSESSMENT — PAIN SCALES - GENERAL: PAINLEVEL_OUTOF10: 0 - NO PAIN

## 2024-01-10 NOTE — PROGRESS NOTES
"Physical Therapy  Physical Therapy Treatment    Patient Name: Michelle Reyez  MRN: [id number]  Today's Date: 1/10/2024  Time Calculation  Start Time: 1817  Stop Time: 1905  Time Calculation (min): 48 min    Insurance:  Visit number: 5 of 30  Authorization info: Not needed  Insurance Type: [employer] of [place]  Cert date start:    Cert date end:                 General  Reason for Referral: Left knee OA  Referred By: KYARA Bucknereason for visit- OA multiple sites, Lt. Knee  Referring doctor- Terra Cruz MD    Current Problem  1. Chronic pain of both knees  Follow Up In Physical Therapy          Precautions  Precautions Comment: Falls,    Pain Assessment: 0-10  Pain Score: 0 - No pain    Subjective:  No current complaint of pain.  Reports compliance with HEP including new exercises.  It is feeling better.  I am not having the pain like I was having before when I do the steps.    HEP Performed:  Yes    Objective:   Function- walking with an antalgic gait, w/o a.d , increased lateral trunk lean in stance.        Knee ROM      ( ) = measurements at evaluation                                                                Left knee: Ext= -5 (-7)         Flex= 80 (85)  Right knee: Ext= -2(-5)       Flex= 110 (103)    Treatments:   Nustep - 5'  DBE 2x2'  KB 4kg tandem stand R/L, L/R cw/ccw- 10 ea dir.  Sit to stand with arms for up   2x12    Stand hip abd 2x10 yenny -not performed    Stand hip ext 2x10 yenny -not performed    Heel slide- 10\"/5   Leg press 60# 2x10   Hip ext 33# x10   Hamstring curl 30# 2x15  Hip ab/ad 32.5#/32.5# 2x15  Ice bilateral knees- 10'       Access Code: [email or web address]   Assessment:   Overall progressing well and tolerated session w/o complaint.    Left knee ROM unchanged for flexion,but slight improvement in extension .  Right knee flexion shows continued improvement.      Plan:   Continue decreasing left knee discomfort increasing rom, flexibility, mobility, balance, " strength, endurance and function for return to normal ADL.    Yunier Cuenca, PT

## 2024-01-15 ENCOUNTER — TREATMENT (OUTPATIENT)
Dept: PHYSICAL THERAPY | Facility: CLINIC | Age: 69
End: 2024-01-15
Payer: COMMERCIAL

## 2024-01-15 DIAGNOSIS — M25.561 CHRONIC PAIN OF BOTH KNEES: ICD-10-CM

## 2024-01-15 DIAGNOSIS — G89.29 CHRONIC PAIN OF BOTH KNEES: ICD-10-CM

## 2024-01-15 DIAGNOSIS — M25.562 CHRONIC PAIN OF BOTH KNEES: ICD-10-CM

## 2024-01-15 PROCEDURE — 97110 THERAPEUTIC EXERCISES: CPT | Mod: GP | Performed by: PHYSICAL THERAPIST

## 2024-01-15 PROCEDURE — 97112 NEUROMUSCULAR REEDUCATION: CPT | Mod: GP | Performed by: PHYSICAL THERAPIST

## 2024-01-15 NOTE — PROGRESS NOTES
"Physical Therapy  Physical Therapy Treatment    Patient Name: Michelle Reyez  MRN: [id number]  Today's Date: 1/15/2024       Insurance:  Visit number: 4  of 30 (+ 2 from 2023)   Authorization info: Not needed  Insurance: [place] - 30 visits , no AUTH 30% co insurance  DX: MEGHA knee pain chronic, OA multiple sites   POC 2 x week 6 weeks          General   Reason for visit- OA multiple sites, Lt. Knee  Referring doctor- Terra Cruz MD    Current Problem  1. Chronic pain of both knees  Follow Up In Physical Therapy                    Subjective:  I am getting better.  No current complaint of pain.    HEP Performed:  Yes    Objective:   Function- walking with an antalgic gait, w/o a.d , increased lateral trunk lean in stance.        Knee ROM      ( ) = measurements at evaluation                                                                Left knee: Ext= -4 (-7)         Flex= 80 (85)  Right knee: Ext= -2(-5)       Flex= 110 (103)    Treatments:   Nustep L4 - 5'  Seated knee flexion left 10\"/10 (Added to HEP)    Step stretch for knee flexion 10\"/10 MEGHA   Step ups 6\" x15   DBE 2x2'  KB 4kg tandem stand R/L, L/R cw/ccw- 15 ea dir.  Sit to stand with arms for up   2x12  -not performed    Leg press 60# 3x10   Hip ext 33# x15    Hamstring curl 30# 2x15  Hip ab/ad 32.5#/32.5# 2x15  Ice bilateral knees- 10'       Access Code: [email or web address]   Assessment:   Slight improvement in knee flexion after seated stretch, issued to HEP.   [place] better stretches with seated stretch and step stretch for flexion.  Issued to replace heel slides in HEP.       Plan:   Continue decreasing left knee discomfort increasing rom, flexibility, mobility, balance, strength, endurance and function for return to normal ADL.    Yunier Cuenca, PT  "

## 2024-01-17 ENCOUNTER — TREATMENT (OUTPATIENT)
Dept: PHYSICAL THERAPY | Facility: CLINIC | Age: 69
End: 2024-01-17
Payer: COMMERCIAL

## 2024-01-17 DIAGNOSIS — M25.562 CHRONIC PAIN OF BOTH KNEES: ICD-10-CM

## 2024-01-17 DIAGNOSIS — M25.561 CHRONIC PAIN OF BOTH KNEES: ICD-10-CM

## 2024-01-17 DIAGNOSIS — G89.29 CHRONIC PAIN OF BOTH KNEES: ICD-10-CM

## 2024-01-17 PROCEDURE — 97112 NEUROMUSCULAR REEDUCATION: CPT | Mod: GP | Performed by: PHYSICAL THERAPIST

## 2024-01-17 PROCEDURE — 97110 THERAPEUTIC EXERCISES: CPT | Mod: GP | Performed by: PHYSICAL THERAPIST

## 2024-01-17 NOTE — PROGRESS NOTES
"Physical Therapy  Physical Therapy Treatment    Patient Name: Michelle Reyez  MRN: [id number]  Today's Date: 1/17/2024  Time Calculation  Start Time: 1812  Stop Time: 1900  Time Calculation (min): 48 min    Insurance:  Visit number: 5  of 30 (+ 2 from 2023)   Authorization info: Not needed  Insurance: [place] - 30 visits , no AUTH 30% co insurance  DX: MEGHA knee pain chronic, OA multiple sites   POC 2 x week 6 weeks          General   Reason for visit- OA multiple sites, Lt. Knee  Referring doctor- Terra Cruz MD    Current Problem  1. Chronic pain of both knees  Follow Up In Physical Therapy                    Subjective:  No current complaint of pain.  New knee stretch seems to help.   HEP Performed:  Yes    Objective:   Function- walking with an antalgic gait, w/o a.d , increased lateral trunk lean in stance.        Knee ROM      ( ) = measurements at evaluation                                                                Left knee: Ext= -4 (-7)         Flex= 85 (85)  Right knee: Ext= -2(-5)       Flex= 110 (103)    Treatments:   Nustep L4 - 5'  Seated knee flexion left 10\"/10   Step stretch for knee flexion 10\"/10 MEGHA   Step ups 6\" x15   DBE 2x2'  KB 4kg tandem stand R/L, L/R cw/ccw- 15 ea dir.  Leg press 65# 2x15   Hip ext 33# x20  Hamstring curl 30# 2x10  Hip ab/ad 32.5#/32.5# 2x15  Ice bilateral knees- 10'       Access Code: [email or web address]   Assessment:   Did improve left knee flexion back up to eval measurement.  Tolerated session w/o complaint of increased pain., but noted greater challenge.       Plan:   Continue decreasing left knee discomfort increasing rom, flexibility, mobility, balance, strength, endurance and function for return to normal ADL.    Yunier Cuenca, PT  "

## 2024-01-20 DIAGNOSIS — F32.A DEPRESSION, UNSPECIFIED DEPRESSION TYPE: ICD-10-CM

## 2024-01-20 RX ORDER — CITALOPRAM 20 MG/1
20 TABLET, FILM COATED ORAL DAILY
Qty: 90 TABLET | Refills: 1 | Status: SHIPPED | OUTPATIENT
Start: 2024-01-20 | End: 2024-04-19 | Stop reason: WASHOUT

## 2024-01-22 ENCOUNTER — TREATMENT (OUTPATIENT)
Dept: PHYSICAL THERAPY | Facility: CLINIC | Age: 69
End: 2024-01-22
Payer: COMMERCIAL

## 2024-01-22 DIAGNOSIS — M25.561 CHRONIC PAIN OF BOTH KNEES: ICD-10-CM

## 2024-01-22 DIAGNOSIS — G89.29 CHRONIC PAIN OF BOTH KNEES: ICD-10-CM

## 2024-01-22 DIAGNOSIS — M25.562 CHRONIC PAIN OF BOTH KNEES: ICD-10-CM

## 2024-01-22 PROCEDURE — 97112 NEUROMUSCULAR REEDUCATION: CPT | Mod: GP | Performed by: PHYSICAL THERAPIST

## 2024-01-22 PROCEDURE — 97110 THERAPEUTIC EXERCISES: CPT | Mod: GP | Performed by: PHYSICAL THERAPIST

## 2024-01-22 NOTE — PROGRESS NOTES
"Physical Therapy  Physical Therapy Treatment    Patient Name: Michelle Reyez  MRN: [id number]  Today's Date: 1/22/2024  Time Calculation  Start Time: 1812  Stop Time: 1900  Time Calculation (min): 48 min    Insurance:  Visit number: 6  of 30 (+ 2 from 2023)   Authorization info: Not needed  Insurance: [place] - 30 visits , no AUTH 30% co insurance  DX: MEGHA knee pain chronic, OA multiple sites   POC 2 x week 6 weeks          General   Reason for visit- OA multiple sites, Lt. Knee  Referring doctor- Terra Cruz MD    Current Problem  1. Chronic pain of both knees  Follow Up In Physical Therapy                    Subjective:  I am hurting everywhere today.  I had a root canal earlier and I had to shovel snow on Friday.   Current pain 4/10.   HEP Performed:  Yes    Objective:   Function- walking with an antalgic gait, w/o a.d , increased lateral trunk lean in stance.    Windswept posture      Knee ROM      ( ) = measurements at evaluation                                                                Left knee: Ext= -4 (-7)         Flex= 85 (85)  Right knee: Ext= -2(-5)       Flex= 110 (103)    Treatments:   Nustep L3 - 5'  Seated knee flexion left 10\"/10   Step stretch for knee flexion 10\"/10 MEGHA   Step ups 6\" x15 -not performed    DBE 2x2'  KB 4kg tandem stand R/L, L/R cw/ccw- 15 ea dir.  Leg press 65# 2x15   Hip ext 33# x20  Hamstring curl 30# 2x15  Hip ab/ad 32.5#/32.5# 2x15  Ice bilateral knees- 10'       Access Code: [email or web address]   Assessment:   Modified session today due to increased all over pain.  Overall did tolerate exercises w/o complaint of increased pain.       Plan:   Continue decreasing left knee discomfort increasing rom, flexibility, mobility, balance, strength, endurance and function for return to normal ADL.    Yunier Cuenca, PT  "

## 2024-01-24 ENCOUNTER — TREATMENT (OUTPATIENT)
Dept: PHYSICAL THERAPY | Facility: CLINIC | Age: 69
End: 2024-01-24
Payer: COMMERCIAL

## 2024-01-24 DIAGNOSIS — G89.29 CHRONIC PAIN OF BOTH KNEES: ICD-10-CM

## 2024-01-24 DIAGNOSIS — M25.561 CHRONIC PAIN OF BOTH KNEES: ICD-10-CM

## 2024-01-24 DIAGNOSIS — M25.562 CHRONIC PAIN OF BOTH KNEES: ICD-10-CM

## 2024-01-24 PROCEDURE — 97110 THERAPEUTIC EXERCISES: CPT | Mod: GP | Performed by: PHYSICAL THERAPIST

## 2024-01-24 NOTE — PROGRESS NOTES
"Physical Therapy  Physical Therapy Treatment    Patient Name: Michelle Reyez  MRN: [id number]  Today's Date: 1/24/2024  Time Calculation  Start Time: 1820  Stop Time: 1900  Time Calculation (min): 40 min    Insurance:  Visit number: 7  of 30 (+ 2 from 2023)   Authorization info: Not needed  Insurance: [place] - 30 visits , no AUTH 30% co insurance  DX: MEGHA knee pain chronic, OA multiple sites   POC 2 x week 6 weeks          General  Reason for Referral: Left knee OA  Referred By: Arnold Buckner for visit- OA multiple sites, Lt. Knee  Referring doctor- Terra Cruz MD    Current Problem  1. Chronic pain of both knees  Follow Up In Physical Therapy            Precautions  Precautions Comment: Falls,         Subjective:  My knees are not hurting but my mouth is from my dental work.    HEP Performed:  Yes    Objective:   Function- walking with an antalgic gait, w/o a.d , increased lateral trunk lean in stance.    Windswept posture      Knee ROM      ( ) = measurements at evaluation                                                                Left knee: Ext= -4 (-7)         Flex= 87 (85)  Right knee: Ext= -2(-5)       Flex= 110 (103)    Treatments:   Nustep L3 - 5'  Seated knee flexion left 10\"/10   Seated left knee contract relax stretch for flexion   Step stretch for knee flexion 10\"/10 MEGHA -not performed    Step ups 6\" x15 -not performed    DBE 2x2'  KB 4kg tandem stand R/L, L/R cw/ccw- 15 ea dir. -not performed    Leg press 70# 2x15   Hip ext 33# x20  Hamstring curl 30# 2x15  Hip ab/ad 32.5#/32.5# 2x15  Ice bilateral knees- 10'       Access Code: [email or web address]   Assessment:   Slight improvement in left knee but still limited.  Instructed on contract relax for home.      Plan:   Continue decreasing left knee discomfort increasing rom, flexibility, mobility, balance, strength, endurance and function for return to normal ADL.    Yunier Cuenca, PT  "

## 2024-01-29 ENCOUNTER — TREATMENT (OUTPATIENT)
Dept: PHYSICAL THERAPY | Facility: CLINIC | Age: 69
End: 2024-01-29
Payer: COMMERCIAL

## 2024-01-29 DIAGNOSIS — M25.562 CHRONIC PAIN OF BOTH KNEES: ICD-10-CM

## 2024-01-29 DIAGNOSIS — G89.29 CHRONIC PAIN OF BOTH KNEES: ICD-10-CM

## 2024-01-29 DIAGNOSIS — M25.561 CHRONIC PAIN OF BOTH KNEES: ICD-10-CM

## 2024-01-29 PROCEDURE — 97112 NEUROMUSCULAR REEDUCATION: CPT | Mod: GP | Performed by: PHYSICAL THERAPIST

## 2024-01-29 PROCEDURE — 97110 THERAPEUTIC EXERCISES: CPT | Mod: GP | Performed by: PHYSICAL THERAPIST

## 2024-01-29 NOTE — PROGRESS NOTES
"Physical Therapy  Physical Therapy Treatment    Patient Name: Michelle Reyez  MRN: [id number]  Today's Date: 1/29/2024  Time Calculation  Start Time: 1813  Stop Time: 1901  Time Calculation (min): 48 min    Insurance:  Visit number: 8  of 30 (+ 2 from 2023)   Authorization info: Not needed  Insurance: [place] - 30 visits , no AUTH 30% co insurance  DX: MEGHA knee pain chronic, OA multiple sites   POC 2 x week 6 weeks          General  Reason for Referral: Left knee OA  Referred By: Arnold Buckner for visit- OA multiple sites, Lt. Knee  Referring doctor- Terra Cruz MD    Current Problem  1. Chronic pain of both knees  Follow Up In Physical Therapy            Precautions  Precautions Comment: Falls,         Subjective:  My knee is a little sore 5/10.   Still struggling with the bend of my knee.  Since starting therapy reports 80% decrease in pain.  Still having trouble with stairs, getting out of chairs but feels improvements with therapy.  Does not feel ready yet to be on her own.    HEP Performed:  Yes    Objective:   Function- walking with an antalgic gait, w/o a.d , increased lateral trunk lean in stance.    Windswept posture      Knee ROM      ( ) = measurements at evaluation                                                                Left knee: Ext= -4 (-7)         Flex= 87 (85)  Right knee: Ext= -2(-5)       Flex= 115 (103)    Knee Strength                                                                Extension:        L= 5/5 [] R= 5/5  Flexion:            L= 5/5 [] R= 5/5     Hip Strength MMT  Flex:     L= 4/5 [] R= 4+/5   Abd      L= 4/5 [] R= 4+/5  Add      L= NT/5 [] R= NT/5  Ext        L= 4/5 [] R= 4/5      Lefs 67/80, 47 at eval     Treatments:   Nustep L3 - 5'  Seated knee flexion left 10\"/10 - in HEP   BOSU step up x20 megha   DBE 2x2'  Tandem walk in // bar 3 laps   Leg press 75# 2x10  Hip ext 33# x20  Hamstring curl 35# 2x10  Hip ab/ad 40# 2x1  Ice bilateral knees- 10' "       Access Code: [email or web address]   Assessment:   Left knee flexion Rom grossly unchanged but has improved in extension and right knee range grossly.  Improvements in MEGHA hip strength but left still lacking adequate strength for left flexion abduction, and MEGHA hip extension.  Remains a good candidate for skilled therapy to work on remaining deficits and  hep progression.      Active       PT Problem       PT Goal 1 (Progressing)       Start:  12/11/23    Expected End:  01/22/24       Patient will demonstrate good understanding and compliance with HEP -goal met   ROM left knee -5 to 120- nearly met          PT Goal 2 (Progressing)       Start:  12/11/23    Expected End:  02/19/24       Knee ROM MEGHA -3 to 120 or better not met   Will ascend/descend stairs with reciprocal pattern  and rail to improve household mobility - in progress/progressing   Tolerate walking for normal activities and work w/o need to reach for external support -goal met   Strength left knee 5-/5 , hips 4+/5 to improve gait and transfers met for knee in progress for hips   Increase LEFS score by 9 points -goal met                Plan:   Continue decreasing left knee discomfort increasing rom, flexibility, mobility, balance, strength, endurance and function for return to normal ADL. 1 session per week 4 weeks from recheck 1/29/23    Yunier Cuenca, PT

## 2024-02-05 ENCOUNTER — TREATMENT (OUTPATIENT)
Dept: PHYSICAL THERAPY | Facility: CLINIC | Age: 69
End: 2024-02-05
Payer: COMMERCIAL

## 2024-02-05 DIAGNOSIS — G89.29 CHRONIC PAIN OF BOTH KNEES: Primary | ICD-10-CM

## 2024-02-05 DIAGNOSIS — M15.9 PRIMARY OSTEOARTHRITIS INVOLVING MULTIPLE JOINTS: ICD-10-CM

## 2024-02-05 DIAGNOSIS — M25.562 CHRONIC PAIN OF BOTH KNEES: Primary | ICD-10-CM

## 2024-02-05 DIAGNOSIS — M25.561 CHRONIC PAIN OF BOTH KNEES: Primary | ICD-10-CM

## 2024-02-05 PROCEDURE — 97110 THERAPEUTIC EXERCISES: CPT | Mod: GP | Performed by: PHYSICAL THERAPIST

## 2024-02-05 PROCEDURE — 97112 NEUROMUSCULAR REEDUCATION: CPT | Mod: GP | Performed by: PHYSICAL THERAPIST

## 2024-02-05 NOTE — PROGRESS NOTES
"Physical Therapy  Physical Therapy Treatment    Patient Name: Michelle Reyez  MRN: [id number]  Today's Date: 2/5/2024  Time Calculation  Start Time: 1730  Stop Time: 1820  Time Calculation (min): 50 min    Insurance:  Visit number: 9 of 30 (+ 2 from 2023)   Authorization info: Not needed  Insurance: [place] - 30 visits , no AUTH 30% co insurance  DX: MEGHA knee pain chronic, OA multiple sites   POC 2 x week 6 weeks          General   Reason for visit- OA multiple sites, Lt. Knee  Referring doctor- Terra Cruz MD    Current Problem  1. Chronic pain of both knees        2. Primary osteoarthritis involving multiple joints                          Subjective:  I am not having currently.    HEP Performed:  Yes    Objective:   Function- walking with an antalgic gait, w/o a.d , increased lateral trunk lean in stance.    Windswept posture      Knee ROM      ( ) = measurements at evaluation                                                                Left knee: Ext= -4 (-7)         Flex= 87 (85)  Right knee: Ext= -2(-5)       Flex= 115 (103)    Knee Strength                                                                Extension:        L= 5/5 [] R= 5/5  Flexion:            L= 5/5 [] R= 5/5     Hip Strength MMT  Flex:     L= 4/5 [] R= 4+/5   Abd      L= 4/5 [] R= 4+/5  Add      L= NT/5 [] R= NT/5  Ext        L= 4/5 [] R= 4/5      Lefs 67/80, 47 at eval     Treatments:   Nustep L3 - 5'  Seated knee flexion left 10\"/10 - in HEP   BOSU step up x20 megha   Step down 4\" x20   DBE 2x2'  Tandem walk in // bar 4 laps (fwd/back)   Leg press 75# 2x10  Hip ext 33# x20  Hamstring curl 35# 2x10  Hip ab/ad 40# 2x15  Ice bilateral knees- 10'       Access Code: [email or web address]   Assessment:   Tolerated session well w/o increased pain.  Did struggle with hamstring curl.             Plan:   Continue decreasing left knee discomfort increasing rom, flexibility, mobility, balance, strength, endurance and function for return to normal ADL. 1 " session per week 4 weeks from recheck 1/29/23    Yunier Cuenca, PT

## 2024-02-12 ENCOUNTER — TREATMENT (OUTPATIENT)
Dept: PHYSICAL THERAPY | Facility: CLINIC | Age: 69
End: 2024-02-12
Payer: COMMERCIAL

## 2024-02-12 DIAGNOSIS — M25.562 CHRONIC PAIN OF BOTH KNEES: Primary | ICD-10-CM

## 2024-02-12 DIAGNOSIS — G89.29 CHRONIC PAIN OF BOTH KNEES: Primary | ICD-10-CM

## 2024-02-12 DIAGNOSIS — M25.561 CHRONIC PAIN OF BOTH KNEES: Primary | ICD-10-CM

## 2024-02-12 DIAGNOSIS — M15.9 PRIMARY OSTEOARTHRITIS INVOLVING MULTIPLE JOINTS: ICD-10-CM

## 2024-02-12 PROCEDURE — 97110 THERAPEUTIC EXERCISES: CPT | Mod: GP,CQ | Performed by: SPECIALIST/TECHNOLOGIST

## 2024-02-12 ASSESSMENT — PAIN SCALES - GENERAL: PAINLEVEL_OUTOF10: 2

## 2024-02-12 ASSESSMENT — PAIN - FUNCTIONAL ASSESSMENT: PAIN_FUNCTIONAL_ASSESSMENT: 0-10

## 2024-02-12 NOTE — PROGRESS NOTES
"Physical Therapy  Physical Therapy Treatment    Patient Name: Michelle Reyez  MRN: [id number]  Today's Date: 2/12/2024  Time Calculation  Start Time: 1353  Stop Time: 1448  Time Calculation (min): 55 min    Insurance:  Visit number: 10  of 30 (+ 2 from 2023)   Authorization info: Not needed  Insurance: [place] - 30 visits , no AUTH 30% co insurance  DX: MEGHA knee pain chronic, OA multiple sites   POC 2 x week 6 weeks          General  Reason for Referral: Left knee OA  Referred By: Arnold Buckner for visit- OA multiple sites, Lt. Knee  Referring doctor- Terra Cruz MD    Current Problem  1. Chronic pain of both knees        2. Primary osteoarthritis involving multiple joints            Precautions  Precautions Comment: Falls,    Pain Assessment: 0-10  Pain Score: 2    Subjective:  Patient arrived full weight bearing with a slight limp noting very little pain on arrival 1-2 of 10.  Patient arrived a few minutes late.      HEP Performed:  Yes    Objective:   Function- walking with an antalgic gait, w/o a.d , increased lateral trunk lean in stance.      Knee ROM                                                                     Left knee: Ext= -4         Flex= 85   Right knee: Ext= -2       Flex= 108    No effusion  Decreased VMO definition    Treatments:   Nustep L3 - 5'  DBE 2x2 min   6\" step ups R/L 10x  4\" Step down x10   Hip ext R/L 44#/44# 20x   Tandem walk in // bar 4 laps (fwd/back)   Leg press 75# 2x10  Hip ext 33# x20  Hamstring curl 35# 2x10  Hip ab/ad 40# 20x  Ice bilateral knees- 10'       Access Code: [email or web address]   Assessment:   Patient tolerated intensity noting challenge of step downs L>R.  Patient noted no increase in symptoms post treatment.        Plan:   Continue decreasing left knee discomfort increasing rom, flexibility, mobility, balance, strength, endurance and function for return to normal ADL. Continue 1 session per week for 3 more weeks    Tristen Horne PTA  "

## 2024-02-21 ENCOUNTER — OFFICE VISIT (OUTPATIENT)
Dept: PRIMARY CARE | Facility: CLINIC | Age: 69
End: 2024-02-21
Payer: COMMERCIAL

## 2024-02-21 VITALS — SYSTOLIC BLOOD PRESSURE: 144 MMHG | WEIGHT: 208 LBS | BODY MASS INDEX: 34.61 KG/M2 | DIASTOLIC BLOOD PRESSURE: 100 MMHG

## 2024-02-21 DIAGNOSIS — I10 HYPERTENSION, UNSPECIFIED TYPE: ICD-10-CM

## 2024-02-21 DIAGNOSIS — F32.A DEPRESSION, UNSPECIFIED DEPRESSION TYPE: Primary | ICD-10-CM

## 2024-02-21 DIAGNOSIS — G47.00 INSOMNIA, UNSPECIFIED TYPE: ICD-10-CM

## 2024-02-21 PROCEDURE — 3077F SYST BP >= 140 MM HG: CPT | Performed by: INTERNAL MEDICINE

## 2024-02-21 PROCEDURE — 3080F DIAST BP >= 90 MM HG: CPT | Performed by: INTERNAL MEDICINE

## 2024-02-21 PROCEDURE — 1036F TOBACCO NON-USER: CPT | Performed by: INTERNAL MEDICINE

## 2024-02-21 PROCEDURE — 93000 ELECTROCARDIOGRAM COMPLETE: CPT | Performed by: INTERNAL MEDICINE

## 2024-02-21 PROCEDURE — 99214 OFFICE O/P EST MOD 30 MIN: CPT | Performed by: INTERNAL MEDICINE

## 2024-02-21 PROCEDURE — 1125F AMNT PAIN NOTED PAIN PRSNT: CPT | Performed by: INTERNAL MEDICINE

## 2024-02-21 RX ORDER — LOSARTAN POTASSIUM 25 MG/1
25 TABLET ORAL DAILY
Qty: 30 TABLET | Refills: 2 | Status: SHIPPED | OUTPATIENT
Start: 2024-02-21 | End: 2024-03-18 | Stop reason: SDUPTHER

## 2024-02-21 RX ORDER — HYDROXYZINE HYDROCHLORIDE 25 MG/1
25 TABLET, FILM COATED ORAL EVERY EVENING
Qty: 30 TABLET | Refills: 2 | Status: SHIPPED | OUTPATIENT
Start: 2024-02-21 | End: 2024-03-19 | Stop reason: SDUPTHER

## 2024-02-21 ASSESSMENT — PATIENT HEALTH QUESTIONNAIRE - PHQ9
1. LITTLE INTEREST OR PLEASURE IN DOING THINGS: NOT AT ALL
2. FEELING DOWN, DEPRESSED OR HOPELESS: NOT AT ALL
SUM OF ALL RESPONSES TO PHQ9 QUESTIONS 1 AND 2: 0

## 2024-02-21 ASSESSMENT — ENCOUNTER SYMPTOMS
CONSTITUTIONAL NEGATIVE: 1
CARDIOVASCULAR NEGATIVE: 1
GASTROINTESTINAL NEGATIVE: 1
SLEEP DISTURBANCE: 1
RESPIRATORY NEGATIVE: 1

## 2024-02-21 NOTE — PROGRESS NOTES
Chief Complaint:   Chief Complaint   Patient presents with    Follow-up      HPI    Michelle Reyez is a 68 y.o. year old female who presents to the clinic for follow up.    Past Medical History   Past Medical History:   Diagnosis Date    Body mass index (BMI) 32.0-32.9, adult 09/23/2020    BMI 32.0-32.9,adult    Body mass index (BMI) 34.0-34.9, adult 09/23/2020    Body mass index (BMI) of 34.0 to 34.9 in adult    Other conditions influencing health status 10/18/2018    History of cough    Other specified disorders of nose and nasal sinuses 10/18/2018    Sinus pressure    Unspecified injury of unspecified wrist, hand and finger(s), initial encounter     Wrist injury      Past Surgical History:   No past surgical history on file.  Family History:   No family history on file.  Social History:   Tobacco Use: Low Risk  (12/21/2023)    Patient History     Smoking Tobacco Use: Never     Smokeless Tobacco Use: Never     Passive Exposure: Not on file      Social History     Substance and Sexual Activity   Alcohol Use Never        Allergies:   Allergies   Allergen Reactions    Penicillins Anaphylaxis and Rash     Thinks gets hives and throat closed    Strawberry Rash        ROS   Review of Systems   Constitutional: Negative.    Respiratory: Negative.     Cardiovascular: Negative.    Gastrointestinal: Negative.    Psychiatric/Behavioral:  Positive for sleep disturbance.         Objective   Vitals:    02/21/24 0914   BP: (!) 144/100      BMI Readings from Last 15 Encounters:   02/21/24 34.61 kg/m²   12/27/23 33.28 kg/m²   12/21/23 33.35 kg/m²   11/07/23 33.12 kg/m²      94.3 kg (208 lb) (2/21/2024  9:14 AM)      Physical Exam  Physical Exam  Constitutional:       Appearance: She is well-developed.   Cardiovascular:      Rate and Rhythm: Normal rate and regular rhythm.      Heart sounds: Normal heart sounds. No murmur heard.  Pulmonary:      Effort: Pulmonary effort is normal.      Breath sounds: Normal breath sounds.  "  Abdominal:      General: Bowel sounds are normal.      Palpations: Abdomen is soft.          Labs:  Lab Results   Component Value Date    WBC 6.6 10/16/2023    HGB 12.6 10/16/2023    HCT 41.1 10/16/2023    MCV 85 10/16/2023     10/16/2023     Lab Results   Component Value Date    GLUCOSE 95 10/16/2023    CALCIUM 9.6 10/16/2023     10/16/2023    K 4.1 10/16/2023    CO2 28 10/16/2023     10/16/2023    BUN 12 10/16/2023    CREATININE 0.79 10/16/2023         No components found for: \"URINE ALBUMIN\"  Lab Results   Component Value Date    HGBA1C 5.6 10/16/2023      Lab Results   Component Value Date    HGBA1C 5.6 10/16/2023    HGBA1C 5.6 09/23/2020     Lab Results   Component Value Date    TSH 2.80 10/16/2023       Current Medications:  Current Outpatient Medications   Medication Sig Dispense Refill    citalopram (CeleXA) 20 mg tablet TAKE 1 TABLET BY MOUTH EVERY DAY 90 tablet 1    baclofen (Lioresal) 10 mg tablet Take 1 tablet (10 mg) by mouth 2 times a day. 60 tablet 0    hydrOXYzine HCL (Atarax) 25 mg tablet Take 1 tablet (25 mg) by mouth once daily in the evening. 30 tablet 2    losartan (Cozaar) 25 mg tablet Take 1 tablet (25 mg) by mouth once daily. 30 tablet 2     No current facility-administered medications for this visit.       Assessment and Plan  Michelle was seen today for follow-up.  Diagnoses and all orders for this visit:  Depression, unspecified depression type (Primary)  -     CBC; Future  -     Comprehensive Metabolic Panel; Future  -     TSH with reflex to Free T4 if abnormal; Future  -     Hemoglobin A1C; Future  -     Lipid Panel; Future  -     Vitamin D 25-Hydroxy,Total (for eval of Vitamin D levels); Future  -     Hepatitis C antibody; Future  -     Cologuard® colon cancer screening; Future  -     Cologuard® colon cancer screening  Hypertension, unspecified type  -     CBC; Future  -     Comprehensive Metabolic Panel; Future  -     TSH with reflex to Free T4 if abnormal; " Future  -     Hemoglobin A1C; Future  -     Lipid Panel; Future  -     Vitamin D 25-Hydroxy,Total (for eval of Vitamin D levels); Future  -     Hepatitis C antibody; Future  -     Cologuard® colon cancer screening; Future  -     Cologuard® colon cancer screening  -     ECG 12 Lead  -     losartan (Cozaar) 25 mg tablet; Take 1 tablet (25 mg) by mouth once daily.  Insomnia, unspecified type  -     hydrOXYzine HCL (Atarax) 25 mg tablet; Take 1 tablet (25 mg) by mouth once daily in the evening.     HTN  Has been checking at home  Numbers are high frequently  EKG done, sinus carl  We will start Losartan 25 mg daily and titrate up slowly  Continue with home monitoring  Follow up in 6 weeks    Insomnia  Anxiety  On Citalopram  Not helping with sleep  Adding Hydroxyzine PRN      Labs:  - CBC, CMP, Urine Albumin, TSH w/ T4, Lipid Panel, HbA1c, Vitamin D ordered, to be done before the next visit      Routine Screening:  - Mammogram up to date  - Colonoscopy/Cologuard due in March  - DEXA Scan scheduled    Immunizations:  Immunization History   Administered Date(s) Administered    Influenza, Seasonal, Quadrivalent, Adjuvanted 10/21/2023    Pfizer COVID-19 vaccine, Fall 2023, 12 years and older, (30mcg/0.3mL) 10/21/2023    Pfizer Purple Cap SARS-CoV-2 03/28/2021, 04/18/2021

## 2024-02-22 ENCOUNTER — TREATMENT (OUTPATIENT)
Dept: PHYSICAL THERAPY | Facility: CLINIC | Age: 69
End: 2024-02-22
Payer: COMMERCIAL

## 2024-02-22 DIAGNOSIS — G89.29 CHRONIC PAIN OF BOTH KNEES: Primary | ICD-10-CM

## 2024-02-22 DIAGNOSIS — M15.9 PRIMARY OSTEOARTHRITIS INVOLVING MULTIPLE JOINTS: ICD-10-CM

## 2024-02-22 DIAGNOSIS — M25.561 CHRONIC PAIN OF BOTH KNEES: Primary | ICD-10-CM

## 2024-02-22 DIAGNOSIS — M25.562 CHRONIC PAIN OF BOTH KNEES: Primary | ICD-10-CM

## 2024-02-22 PROCEDURE — 97112 NEUROMUSCULAR REEDUCATION: CPT | Mod: GP,CQ | Performed by: SPECIALIST/TECHNOLOGIST

## 2024-02-22 PROCEDURE — 97110 THERAPEUTIC EXERCISES: CPT | Mod: GP,CQ | Performed by: SPECIALIST/TECHNOLOGIST

## 2024-02-22 ASSESSMENT — PAIN SCALES - GENERAL: PAINLEVEL_OUTOF10: 2

## 2024-02-22 ASSESSMENT — PAIN - FUNCTIONAL ASSESSMENT: PAIN_FUNCTIONAL_ASSESSMENT: 0-10

## 2024-02-22 NOTE — PROGRESS NOTES
"Physical Therapy  Physical Therapy Treatment    Patient Name: Michelle Reyez  MRN: [id number]  Today's Date: 2/22/2024  Time Calculation  Start Time: 1515  Stop Time: 1615  Time Calculation (min): 60 min    Insurance:  Visit number: 10  of 30 (+ 2 from 2023)   Authorization info: Not needed  Insurance: [place] - 30 visits , no AUTH 30% co insurance  DX: MEGHA knee pain chronic, OA multiple sites   POC 2 x week 6 weeks          General  Reason for Referral: Left knee OA  Referred By: Arnold Buckner for visit- OA multiple sites, Lt. Knee  Referring doctor- Terra Cruz MD    Current Problem  1. Chronic pain of both knees        2. Primary osteoarthritis involving multiple joints            Precautions  Precautions Comment: Falls,    Pain Assessment: 0-10  Pain Score: 2    Subjective:  Patient arrived full weight bearing without a limp noting some pain on arrival 2 of 10 (attributed to change in weather).  Patient arrived a few minutes late due to school related traffic.  Patient noted no soreness at all after last session and felt ice really helped.      HEP Performed:  Yes    Objective:   Function- walking with an antalgic gait, w/o a.d , increased lateral trunk lean in stance.      Knee ROM                                                                     Left knee: Ext= -4         Flex= 85   Right knee: Ext= -2       Flex= 108    No effusion  Decreased VMO definition    Treatments:   Stepper L2 5 min   DBE 2x2 min   6\" step ups R/L 10x  4\" Step down x10   Hip ext R/L 44#/44# 20x   Tandem walk in // bar 4 laps (fwd/back)   Leg press 75# 2x10  Hamstring curl 35# 2x10  Hip ab/ad 40# 20x  Ice bilateral knees- 10'     Access Code: [email or web address]  URL: [email or web address]  Date: 02/22/2024  Prepared by: Tristen Horne    Exercises  - Long Sitting Calf Stretch with Strap  - 2-3 x daily - 1 sets - 3-4 reps - 30 hold  - Supine Knee Extension Strengthening  - 1-2 x daily - 20-30 " reps  - Small Range Straight Leg Raise  - 1-2 x daily - 2-3 sets - 10 reps  - Supine Bridge with Resistance Band  - 1-2 x daily - 2-3 sets - 10 reps  - Standing Hip Extension with Counter Support  - 1-2 x daily - 2-3 sets - 10 reps  - Side Stepping with Resistance at Thighs  - 1 x daily - 2-3 reps - 15-20 feet back and forth  - Squat with Chair Touch and Resistance Loop  - 1 x daily - 7 x weekly - 3 sets - 10 reps  - Standing Hip Hiking  - 1-2 x daily - 2-3 sets - 10 reps  - Seated Knee Flexion AAROM  - 2-3 x daily - 3-4 reps - 30 hold  - Standing Knee Flexion Stretch on Step  - 2-3 x daily - 10 reps - 10 hold  - Step Up  - 1 x daily - 1 sets - 10 reps  - Forward Step Down  - 1 x daily - 1 sets - 10 reps  - Standing Hamstring Stretch on Chair  - 2-3 x daily - 1 sets - 60s hold  - Standing Hip Flexor Stretch  - 2-3 x daily - 1 sets - 60s hold  - Standing Quad Stretch with Table and Chair Support  - 2-3 x daily - 1 sets - 10 reps - 60s hold  Access Code: [email or web address]   Assessment:   Patient tolerated increased intensity noting mild fatigue.   Patient noted no increase in symptoms post treatment.        Plan:   Continue decreasing left knee discomfort increasing rom, flexibility, mobility, balance, strength, endurance and function for return to normal ADL.  Patient has re-check with Yunier Cuenca, PT next Thursday.      Tristen Horne, PTA

## 2024-02-29 ENCOUNTER — TREATMENT (OUTPATIENT)
Dept: PHYSICAL THERAPY | Facility: CLINIC | Age: 69
End: 2024-02-29
Payer: COMMERCIAL

## 2024-02-29 DIAGNOSIS — M25.561 CHRONIC PAIN OF BOTH KNEES: Primary | ICD-10-CM

## 2024-02-29 DIAGNOSIS — M15.9 PRIMARY OSTEOARTHRITIS INVOLVING MULTIPLE JOINTS: ICD-10-CM

## 2024-02-29 DIAGNOSIS — M25.562 CHRONIC PAIN OF BOTH KNEES: Primary | ICD-10-CM

## 2024-02-29 DIAGNOSIS — G89.29 CHRONIC PAIN OF BOTH KNEES: Primary | ICD-10-CM

## 2024-02-29 PROCEDURE — 97110 THERAPEUTIC EXERCISES: CPT | Mod: GP | Performed by: PHYSICAL THERAPIST

## 2024-02-29 NOTE — PROGRESS NOTES
Physical Therapy  Physical Therapy Treatment    Patient Name: Michelle Reyez  MRN: [id number]  Today's Date: 2/29/2024  Time Calculation  Start Time: 1315  Stop Time: 1405  Time Calculation (min): 50 min    Insurance:  Visit number: 11 of 30 (+ 2 from 2023)   Authorization info: Not needed  Insurance: [place] - 30 visits , no AUTH 30% co insurance  DX: MEGHA knee pain chronic, OA multiple sites   POC 2 x week 6 weeks          General  Reason for Referral: Left knee OA  Referred By: Arnold Buckner for visit- OA multiple sites, Lt. Knee  Referring doctor- Terra Cruz MD    Current Problem  1. Chronic pain of both knees        2. Primary osteoarthritis involving multiple joints                        Subjective:  Pain today is about a 4/10.  Prior to that is has been really good maybe 1/10.  The recent weather change has made it hurt more and I think I may have overdone it.  The exercise is helping and I am doing stretches after which helps as well.  Overall reports 85-90% improvement since starting therapy.      HEP Performed:  Yes    Objective:   Function- walking with an antalgic gait, w/o a.d , increased lateral trunk lean in stance.      Knee ROM      ( ) = measurements at evaluation                                                                Left knee: Ext= -4 (-7)         Flex= 87 (85)  Right knee: Ext= -2(-5)       Flex= 115 (103)     Knee Strength                                                                Extension:        L= 5/5 (4/5) [] R= 5/5 (5-/5)  Flexion:            L= 5/5 (4/5)[] R= 5/5  (5-/5)      Hip Strength MMT  Flex:     L= 5-/5(4-/5) [] R= 5-/5 (4-/5)  Abd      L= 4+/5 (4-/5)[] R= 4+/5(4-/5)  Add      L= NT/5 [] R= NT/5  Ext        L= 4+/5(4-/5) [] R= 4+/5(4-/5)        Lefs 57/80, 47 at eval       Treatments:   Stepper L2 5 min   DBE 2x2 min     Hip ext R/L 44#/44# 20x   Tandem walk in // bar 4 laps (fwd/back)   Leg press 75# 2x10  Hamstring curl 35# 2x10  Hip  ab/ad 40# 20x  Ice bilateral knees- 10'     Access Code: [email or web address]  URL: [email or web address]  Date: 02/22/2024  Prepared by: Tristen Horne    Exercises  - Long Sitting Calf Stretch with Strap  - 2-3 x daily - 1 sets - 3-4 reps - 30 hold  - Supine Knee Extension Strengthening  - 1-2 x daily - 20-30 reps  - Small Range Straight Leg Raise  - 1-2 x daily - 2-3 sets - 10 reps  - Supine Bridge with Resistance Band  - 1-2 x daily - 2-3 sets - 10 reps  - Standing Hip Extension with Counter Support  - 1-2 x daily - 2-3 sets - 10 reps  - Side Stepping with Resistance at Thighs  - 1 x daily - 2-3 reps - 15-20 feet back and forth  - Squat with Chair Touch and Resistance Loop  - 1 x daily - 7 x weekly - 3 sets - 10 reps  - Standing Hip Hiking  - 1-2 x daily - 2-3 sets - 10 reps  - Seated Knee Flexion AAROM  - 2-3 x daily - 3-4 reps - 30 hold  - Standing Knee Flexion Stretch on Step  - 2-3 x daily - 10 reps - 10 hold  - Step Up  - 1 x daily - 1 sets - 10 reps  - Forward Step Down  - 1 x daily - 1 sets - 10 reps  - Standing Hamstring Stretch on Chair  - 2-3 x daily - 1 sets - 60s hold  - Standing Hip Flexor Stretch  - 2-3 x daily - 1 sets - 60s hold  - Standing Quad Stretch with Table and Chair Support  - 2-3 x daily - 1 sets - 10 reps - 60s hold  Access Code: [email or web address]     Assessment:   Patient has made good progress toward goals and has met most of them.  Reports being comfortable with Hep and continuing independently.      Active       PT Problem       PT Goal 1 (Adequate for Discharge)       Start:  12/11/23    Expected End:  01/22/24       Patient will demonstrate good understanding and compliance with HEP -goal met   ROM left knee -5 to 120- nearly met          PT Goal 2 (Adequate for Discharge)       Start:  12/11/23    Expected End:  02/19/24       Knee ROM MEGHA -3 to 120 or better not met   Will ascend/descend stairs with reciprocal pattern  and rail to improve household mobility --goal met   Tolerate  walking for normal activities and work w/o need to reach for external support -goal met   Strength left knee 5-/5 , hips 4+/5 to improve gait and transfers -goal met   Increase LEFS score by 9 points -goal met                  Plan:   Discharge to [facility] and MD at this time secondary to goals mostly met.    Yunier Cuenca, PT

## 2024-03-15 ENCOUNTER — HOSPITAL ENCOUNTER (OUTPATIENT)
Dept: RADIOLOGY | Facility: HOSPITAL | Age: 69
Discharge: HOME | End: 2024-03-15
Payer: COMMERCIAL

## 2024-03-15 DIAGNOSIS — Z13.820 OSTEOPOROSIS SCREENING: ICD-10-CM

## 2024-03-15 PROCEDURE — 77080 DXA BONE DENSITY AXIAL: CPT | Performed by: RADIOLOGY

## 2024-03-15 PROCEDURE — 77080 DXA BONE DENSITY AXIAL: CPT

## 2024-03-18 DIAGNOSIS — I10 HYPERTENSION, UNSPECIFIED TYPE: ICD-10-CM

## 2024-03-18 RX ORDER — LOSARTAN POTASSIUM 25 MG/1
25 TABLET ORAL DAILY
Qty: 90 TABLET | Refills: 0 | Status: SHIPPED | OUTPATIENT
Start: 2024-03-18 | End: 2024-04-19 | Stop reason: SDUPTHER

## 2024-03-19 DIAGNOSIS — G47.00 INSOMNIA, UNSPECIFIED TYPE: ICD-10-CM

## 2024-03-19 RX ORDER — HYDROXYZINE HYDROCHLORIDE 25 MG/1
25 TABLET, FILM COATED ORAL EVERY EVENING
Qty: 90 TABLET | Refills: 0 | Status: SHIPPED | OUTPATIENT
Start: 2024-03-19 | End: 2024-04-19 | Stop reason: SDUPTHER

## 2024-03-26 NOTE — RESULT ENCOUNTER NOTE
Hi, your bone scan showed low bone mass.    What can you do to keep your bones as healthy as possible?  You can:  Eat foods with a lot of calcium, such as milk, yogurt, and green leafy vegetables   Eat foods with a lot of vitamin D, such as milk that has vitamin D added, and fish from the ocean  Take calcium and vitamin D pills (if you do not get enough from the food that you eat)  Be active for at least 30 minutes, most days of the week  Avoid smoking  Limit the amount of alcohol you drink to 1 to 2 drinks a day at most  What else can you do to avoid fractures?  It sounds simple, but you can prevent a lot of fractures by reducing the chances of a fall. To do that:  Make sure all your rugs have a no-slip backing to keep them in place  Tuck away any electrical cords, so they are not in your way  Light all walkways well  Watch out for slippery floors  Wear sturdy, comfortable shoes with rubber soles  Have your eyes checked  Ask your doctor or nurse to check whether any of your medicines might make you dizzy or increase your risk of falling

## 2024-04-06 LAB — NONINV COLON CA DNA+OCC BLD SCRN STL QL: NEGATIVE

## 2024-04-07 NOTE — RESULT ENCOUNTER NOTE
I reviewed your results.  Everything looks good.  Please continue with current treatment.  Best,  Dr. Ellison

## 2024-04-19 ENCOUNTER — LAB (OUTPATIENT)
Dept: LAB | Facility: LAB | Age: 69
End: 2024-04-19
Payer: COMMERCIAL

## 2024-04-19 ENCOUNTER — OFFICE VISIT (OUTPATIENT)
Dept: PRIMARY CARE | Facility: CLINIC | Age: 69
End: 2024-04-19
Payer: COMMERCIAL

## 2024-04-19 VITALS — BODY MASS INDEX: 32.95 KG/M2 | WEIGHT: 198 LBS | DIASTOLIC BLOOD PRESSURE: 92 MMHG | SYSTOLIC BLOOD PRESSURE: 130 MMHG

## 2024-04-19 DIAGNOSIS — R45.1 AGITATION: ICD-10-CM

## 2024-04-19 DIAGNOSIS — I10 HYPERTENSION, UNSPECIFIED TYPE: ICD-10-CM

## 2024-04-19 DIAGNOSIS — E56.9 HYPOVITAMINOSIS: Primary | ICD-10-CM

## 2024-04-19 DIAGNOSIS — F41.1 GAD (GENERALIZED ANXIETY DISORDER): ICD-10-CM

## 2024-04-19 DIAGNOSIS — G47.00 INSOMNIA, UNSPECIFIED TYPE: ICD-10-CM

## 2024-04-19 DIAGNOSIS — E56.9 HYPOVITAMINOSIS: ICD-10-CM

## 2024-04-19 DIAGNOSIS — F32.A DEPRESSION, UNSPECIFIED DEPRESSION TYPE: ICD-10-CM

## 2024-04-19 LAB
FOLATE SERPL-MCNC: >24 NG/ML
VIT B12 SERPL-MCNC: 1992 PG/ML (ref 211–911)

## 2024-04-19 PROCEDURE — 82607 VITAMIN B-12: CPT

## 2024-04-19 PROCEDURE — 82746 ASSAY OF FOLIC ACID SERUM: CPT

## 2024-04-19 PROCEDURE — 3080F DIAST BP >= 90 MM HG: CPT | Performed by: INTERNAL MEDICINE

## 2024-04-19 PROCEDURE — 1160F RVW MEDS BY RX/DR IN RCRD: CPT | Performed by: INTERNAL MEDICINE

## 2024-04-19 PROCEDURE — 36415 COLL VENOUS BLD VENIPUNCTURE: CPT

## 2024-04-19 PROCEDURE — 1159F MED LIST DOCD IN RCRD: CPT | Performed by: INTERNAL MEDICINE

## 2024-04-19 PROCEDURE — 3075F SYST BP GE 130 - 139MM HG: CPT | Performed by: INTERNAL MEDICINE

## 2024-04-19 PROCEDURE — 99214 OFFICE O/P EST MOD 30 MIN: CPT | Performed by: INTERNAL MEDICINE

## 2024-04-19 RX ORDER — IBUPROFEN 200 MG
800 TABLET ORAL EVERY 6 HOURS PRN
COMMUNITY

## 2024-04-19 RX ORDER — LOSARTAN POTASSIUM 25 MG/1
25 TABLET ORAL 2 TIMES DAILY
Qty: 180 TABLET | Refills: 0 | Status: SHIPPED | OUTPATIENT
Start: 2024-04-19

## 2024-04-19 RX ORDER — HYDROXYZINE HYDROCHLORIDE 25 MG/1
25 TABLET, FILM COATED ORAL EVERY EVENING
Qty: 90 TABLET | Refills: 1 | Status: SHIPPED | OUTPATIENT
Start: 2024-04-19

## 2024-04-19 RX ORDER — CITALOPRAM 20 MG/1
20 TABLET, FILM COATED ORAL DAILY
Qty: 90 TABLET | Refills: 1 | Status: SHIPPED | OUTPATIENT
Start: 2024-04-19 | End: 2024-04-19 | Stop reason: WASHOUT

## 2024-04-19 RX ORDER — DEXTROMETHORPHAN HYDROBROMIDE, GUAIFENESIN 5; 100 MG/5ML; MG/5ML
650 LIQUID ORAL DAILY
COMMUNITY

## 2024-04-19 RX ORDER — LOSARTAN POTASSIUM 25 MG/1
25 TABLET ORAL DAILY
Qty: 180 TABLET | Refills: 0 | Status: SHIPPED | OUTPATIENT
Start: 2024-04-19 | End: 2024-04-19

## 2024-04-19 NOTE — PROGRESS NOTES
Primary Care Visit Note    Patient: Michelle Reyez, Age: 68 y.o., SEX: female , MRN:[id number],   PCP: Terra Cruz MD        Resident:  Robert Torres MD   Preferred Pharmacy:   [facility]/pharmacy #[id number] - [facility], [place] - [address] [address] [facility]  [address] [facility] [place] [address]  Phone: [phone] Fax: [phone]          Chief Complaint     Patient: Michelle Reyez is a 68 y.o. female who presented to the clinic for   Chief Complaint   Patient presents with    Follow-up        Subjective      HPI    Michelle Reyez is a 68 y.o. year old female patient with a PMH significant for HTN, MDD/JOSEPHINE, occipital neuralgia, OA of rt. Knee, urge incontinence presents to the clinic for 3 month follow-up    Patient had been having dental pain as of recent, patient had root canal surgery and then permanent crown placement. Patient has been taking ibuprofen to help address the pain upto 800 mg    Patient had been having high BP reads at home. Patient brought the reads from home. Have been running in 120s - 150s . Patient states it could be from the pain.     Past History     PMHx:    has a past medical history of Body mass index (BMI) 32.0-32.9, adult (09/23/2020), Body mass index (BMI) 34.0-34.9, adult (09/23/2020), Other conditions influencing health status (10/18/2018), Other specified disorders of nose and nasal sinuses (10/18/2018), and Unspecified injury of unspecified wrist, hand and finger(s), initial encounter.     Allergies:   Allergies   Allergen Reactions    Penicillins Anaphylaxis and Rash     Thinks gets hives and throat closed    Clindamycin Nausea/vomiting    Strawberry Rash      Surgical Hx:   No past surgical history on file.   Social HX:   Social History     Tobacco Use    Smoking status: Never    Smokeless tobacco: Never   Vaping Use    Vaping status: Never Used   Substance Use Topics    Alcohol use: Never    Drug use: Never      MEDS:   Current Outpatient Medications   Medication Instructions     acetaminophen (TYLENOL 8 HOUR) 650 mg, oral, Daily, Do not crush, chew, or split.    hydrOXYzine HCL (ATARAX) 25 mg, oral, Every evening    ibuprofen 800 mg, oral, Every 6 hours PRN    losartan (COZAAR) 25 mg, oral, 2 times daily      Objective      Visit Vitals  BP (!) 130/92      BMI Readings from Last 15 Encounters:   04/19/24 32.95 kg/m²   02/21/24 34.61 kg/m²   12/27/23 33.28 kg/m²   12/21/23 33.35 kg/m²   11/07/23 33.12 kg/m²      Lab Results   Component Value Date    HGBA1C 5.6 10/16/2023      Lab Results   Component Value Date    HGBA1C 5.6 10/16/2023    HGBA1C 5.6 09/23/2020     Lab Results   Component Value Date    CREATININE 0.79 10/16/2023      Lab Results   Component Value Date    WBC 6.6 10/16/2023    HGB 12.6 10/16/2023    HCT 41.1 10/16/2023    MCV 85 10/16/2023     10/16/2023        Chemistry    Lab Results   Component Value Date/Time     10/16/2023 0940    K 4.1 10/16/2023 0940     10/16/2023 0940    CO2 28 10/16/2023 0940    BUN 12 10/16/2023 0940    CREATININE 0.79 10/16/2023 0940    Lab Results   Component Value Date/Time    CALCIUM 9.6 10/16/2023 0940    ALKPHOS 83 10/16/2023 0940    AST 16 10/16/2023 0940    ALT 14 10/16/2023 0940    BILITOT 0.4 10/16/2023 0940        Physical Exam  Physical Exam  Constitutional:       Appearance: Normal appearance.   HENT:      Head: Normocephalic and atraumatic.      Mouth/Throat:      Mouth: Mucous membranes are moist.   Cardiovascular:      Rate and Rhythm: Normal rate and regular rhythm.      Heart sounds: No murmur heard.     No friction rub. No gallop.   Pulmonary:      Breath sounds: Normal breath sounds. No stridor. No wheezing or rhonchi.   Abdominal:      General: Abdomen is flat. Bowel sounds are normal. There is no distension.      Palpations: Abdomen is soft. There is no mass.      Tenderness: There is no abdominal tenderness. There is no guarding.   Musculoskeletal:         General: No swelling or tenderness. Normal range of  motion.      Cervical back: Normal range of motion.   Neurological:      General: No focal deficit present.      Mental Status: She is alert and oriented to person, place, and time.   Psychiatric:         Mood and Affect: Mood normal.         Behavior: Behavior normal.        Assessment and Plan     Assessment/Plan    The following medical issues were discussed during this encounter: Michelle was seen today for follow-up.    Diagnoses and all orders for this visit:    # Depression, unspecified depression type  # Insomnia, unspecified type  # JOSEPHINE (generalized anxiety disorder)  # Agitation  -     Patient was previously taking citalopram 20 mg once every day  -     On talking to patient today, patient was found to be in rapid and patient would become tearful.  Patient also endorsed that her boss has tried to sabotage her career by forging signature like her  before  -     Discontinued citalopram due to concern for depression with paranoia versus bipolar  -     Placed referral to [facility]; Future  -     Refilled hydrOXYzine HCL (Atarax) 25 mg tablet; Take 1 tablet (25 mg) by mouth once daily in the evening.    # Hypovitaminosis  # Osteopenia  -     Reviewed DEXA scan from 12/27/2023: Patient has osteopenia  -     Ordered Vitamin B12; Future  -     Ordered Folate; Future  -     Also recommended that patient's start taking vitamin D supplement.  Gave patient the name of the drug called Citracal a combination of both vitamin D and calcium    # Hypertension, unspecified type  -     Patient brought the records from the home, blood pressure has been running in 130s to 150s  -     Blood pressure today at clinic 130/92 mmHg  -     Patient was previously placed on Cozaar 25 mg once a day  -     Increased losartan (Cozaar) 25 mg tablet to 2 times a day.    Health maintenance  The following screening tests were ordered:  - CBC, CMP, TSH, vitamin D and hemoglobin A1c was previously orderedOn  2/21/2024.  Patient will get those done today    Immunizations: UTD     Please return on August 13 1:20 PM  or if symptoms worsen     Robert Torres MD  Internal Medicine, PGY- 1  04/19/24 at 3:48 PM     Disclaimer: Documentation completed with the information available at the time of input. The times in the chart may not be reflective of actual patient care times, interventions, or procedures. Documentation occurs after the physical care of the patient.

## 2024-04-19 NOTE — PROGRESS NOTES
I reviewed the resident/fellow's documentation and discussed the patient with the resident/fellow. I agree with the resident/fellow's medical decision making as documented in the note.  As the attending physician, I certify that I personally reviewed the patient's history and personally examined the patient to confirm the physical findings described above, and that I reviewed the relevant imaging studies and available reports. I also discussed the differential diagnosis and all of the proposed management plans with the patient and individuals accompanying the patient to this visit. They had the opportunity to ask questions about the proposed management plans and to have those questions answered.     Terra Cruz MD

## 2024-05-24 ENCOUNTER — TELEMEDICINE (OUTPATIENT)
Dept: BEHAVIORAL HEALTH | Facility: CLINIC | Age: 69
End: 2024-05-24
Payer: COMMERCIAL

## 2024-05-24 DIAGNOSIS — F32.A DEPRESSION, UNSPECIFIED DEPRESSION TYPE: ICD-10-CM

## 2024-05-24 DIAGNOSIS — F41.1 GAD (GENERALIZED ANXIETY DISORDER): ICD-10-CM

## 2024-05-24 DIAGNOSIS — R45.1 AGITATION: ICD-10-CM

## 2024-05-24 PROCEDURE — 1160F RVW MEDS BY RX/DR IN RCRD: CPT | Performed by: PSYCHIATRY & NEUROLOGY

## 2024-05-24 PROCEDURE — 99205 OFFICE O/P NEW HI 60 MIN: CPT | Performed by: PSYCHIATRY & NEUROLOGY

## 2024-05-24 PROCEDURE — 1159F MED LIST DOCD IN RCRD: CPT | Performed by: PSYCHIATRY & NEUROLOGY

## 2024-05-25 NOTE — PROGRESS NOTES
History Of Present Illness  Michelle Reyez is a 68 y.o. female presenting with distress due to work situation. Pt gave detailed account of incidents . Major issue is that a supervisor signed patients name to a certain form and pt feeling harrassed by the supervisor. Pt on medical leave- cannot go back to work under this supervisor. Needs help to advocate for herself . Pt gives detailed account of events leading to negative review by supervisor. Does not seem delusional..     Past Medical History  She has a past medical history of Body mass index (BMI) 32.0-32.9, adult (09/23/2020), Body mass index (BMI) 34.0-34.9, adult (09/23/2020), Other conditions influencing health status (10/18/2018), Other specified disorders of nose and nasal sinuses (10/18/2018), and Unspecified injury of unspecified wrist, hand and finger(s), initial encounter.    Past Psychiatric History:   Previous therapy: no  Previous psychiatric treatment and medication trials: no  Previous psychiatric hospitalizations: no  Previous diagnoses: no  Previous suicide attempts: no  History of violence: no  Currently in treatment with .  Depression screening was performed with standardized tool: Yes - No Depression    Surgical History  She has no past surgical history on file.     Social History  She reports that she has never smoked. She has never used smokeless tobacco. She reports that she does not drink alcohol and does not use drugs.     Allergies  Penicillins, Clindamycin, and [place]        Psychiatric ROS - Adult  Anxiety: Negative  Depression: negative  Delirium: negative  Psychosis: negative  Jennifer: negative  Safety Issues: none  Psychiatric ROS Comment:           Mental Status Exam  General: phone + video  Appearance: wdwn  Attitude: cooperative  Behavior: talkative  Motor Activity: no tics/tremors  Speech: fluent productive goal directed  Mood: anxioous  Affect: full range  Thought Process: no ftd  Thought Content: no delusions no si or  hi  Thought Perception: no a/v hallucinations  Cognition: no gross cognitive impairment  Insight: good  Judgement: good    Psychiatric Risk Assessment  Violence Risk Assessment: none  Acute Risk of Harm to Others is Considered: low   Suicide Risk Assessment: age > 65 yrs old and life crisis (shame/despair)  Protective Factors against Suicide: adherence to  treatment, fear of suicide, hopefulness/future orientation, moral objections to suicide, Voodoo affiliation/spirituality, sense of responsibility toward family, and social support/connectedness  Acute Risk of Harm to Self is Considered: low    Last Recorded Vitals  There were no vitals taken for this visit.    Relevant Results             Assessment/Plan   Problem List Items Addressed This Visit             ICD-10-CM    Depression F32.A     Adjustment reaction  pt referred for therapy for supportive therapy. No medication recs.           Other Visit Diagnoses         Codes    JOSEPHINE (generalized anxiety disorder)     F41.1    Agitation     R45.1                   I spent 55 minutes in the professional and overall care of this patient.      Medication Consent  Medication Consent: no medication changes necessary for review    Jeffrey Forbes MD

## 2024-06-12 ENCOUNTER — APPOINTMENT (OUTPATIENT)
Dept: PRIMARY CARE | Facility: CLINIC | Age: 69
End: 2024-06-12
Payer: COMMERCIAL

## 2024-08-09 DIAGNOSIS — I10 HYPERTENSION, UNSPECIFIED TYPE: ICD-10-CM

## 2024-08-09 RX ORDER — LOSARTAN POTASSIUM 25 MG/1
25 TABLET ORAL 2 TIMES DAILY
Qty: 180 TABLET | Refills: 0 | Status: SHIPPED | OUTPATIENT
Start: 2024-08-09

## 2024-08-13 ENCOUNTER — APPOINTMENT (OUTPATIENT)
Dept: PRIMARY CARE | Facility: CLINIC | Age: 69
End: 2024-08-13
Payer: COMMERCIAL

## 2024-12-26 ENCOUNTER — APPOINTMENT (OUTPATIENT)
Dept: OBSTETRICS AND GYNECOLOGY | Facility: CLINIC | Age: 69
End: 2024-12-26
Payer: COMMERCIAL